# Patient Record
Sex: FEMALE | Race: WHITE | Employment: OTHER | URBAN - METROPOLITAN AREA
[De-identification: names, ages, dates, MRNs, and addresses within clinical notes are randomized per-mention and may not be internally consistent; named-entity substitution may affect disease eponyms.]

---

## 2017-05-31 ENCOUNTER — OFFICE VISIT (OUTPATIENT)
Dept: INTERNAL MEDICINE CLINIC | Age: 57
End: 2017-05-31

## 2017-05-31 VITALS
HEIGHT: 65 IN | OXYGEN SATURATION: 96 % | WEIGHT: 133 LBS | BODY MASS INDEX: 22.16 KG/M2 | SYSTOLIC BLOOD PRESSURE: 124 MMHG | DIASTOLIC BLOOD PRESSURE: 75 MMHG | RESPIRATION RATE: 16 BRPM | HEART RATE: 58 BPM | TEMPERATURE: 98.4 F

## 2017-05-31 DIAGNOSIS — S62.622A CLOSED DISPLACED FRACTURE OF MIDDLE PHALANX OF RIGHT MIDDLE FINGER, INITIAL ENCOUNTER: Primary | ICD-10-CM

## 2017-05-31 NOTE — PATIENT INSTRUCTIONS
Finger Fracture: Care Instructions  Your Care Instructions    Breaks in the bones of the finger usually heal well in about 3 to 4 weeks. The pain and swelling from a broken finger can last for weeks. But it should steadily improve, starting a few days after you break it. It is very important that you wear and take care of the cast or splint exactly as your doctor tells you to so that your finger heals properly and does not end up crooked. Wearing a splint may interfere with your normal activities. Ask for help with daily tasks if you need it. You heal best when you take good care of yourself. Eat a variety of healthy foods, and don't smoke. Follow-up care is a key part of your treatment and safety. Be sure to make and go to all appointments, and call your doctor if you are having problems. It's also a good idea to know your test results and keep a list of the medicines you take. How can you care for yourself at home? · If your doctor put a splint on your finger, wear the splint exactly as directed. Do not remove it until your doctor says that you can. · Keep your hand raised above the level of your heart as much as you can. This will help reduce swelling. · Put ice or a cold pack on your finger for 10 to 20 minutes at a time. Try to do this every 1 to 2 hours for the next 3 days (when you are awake) or until the swelling goes down. Put a thin cloth between the ice and your skin. Keep the splint dry. · Be safe with medicines. Take pain medicines exactly as directed. ¨ If the doctor gave you a prescription medicine for pain, take it as prescribed. ¨ If you are not taking a prescription pain medicine, ask your doctor if you can take an over-the-counter medicine. When should you call for help? Call 911 anytime you think you may need emergency care. For example, call if:  · Your finger is cool or pale or changes color.   Call your doctor now or seek immediate medical care if:  · Your pain gets much worse.  · You have tingling, weakness, or numbness in your finger. · You have signs of infection, such as:  ¨ Increased pain, swelling, warmth, or redness. ¨ Red streaks leading from the area. ¨ Pus draining from the area. ¨ Swollen lymph nodes in your neck, armpits, or groin. ¨ A fever. Watch closely for changes in your health, and be sure to contact your doctor if:  · Your finger is not steadily improving. Where can you learn more? Go to http://danica-deejay.info/. Enter L367 in the search box to learn more about \"Finger Fracture: Care Instructions. \"  Current as of: May 23, 2016  Content Version: 11.2  © 6383-8742 [employer]. Care instructions adapted under license by [employer] (which disclaims liability or warranty for this information). If you have questions about a medical condition or this instruction, always ask your healthcare professional. [place] [address] any warranty or liability for your use of this information.

## 2017-05-31 NOTE — PROGRESS NOTES
CC:  Chief Complaint   Patient presents with    Other     broken middle finger right hand     HISTORY OF PRESENT ILLNESS  Avani Powell is a 60 y.o. female. She is a new patient to this clinic. She presents with a finger fracture and is in need of a referral to see an orthopedist. Was holding on to a dog leash when the jerked away. Seen at [facility] today. Diagnosed with fracture of right middle finger. Was told to see a hand orthopedist, Dr. Cleo Bernal. Appointment tomorrow. Needs referral. Denies fevers, chills, CP, or SOB. Patient Active Problem List   Diagnosis Code    Pure hypercholesterolemia E78.00    Elevated blood pressure reading without diagnosis of hypertension R03.0    Depression F32.9    Anxiety state, unspecified F41.1    Insomnia G47.00     Past Medical History:   Diagnosis Date    ASCUS on Pap smear 1998    Chickenpox childhood    Clavicle fracture 1968    Closed fracture of tibial plateau 10/1548    Right.  ([address])    Depression with anxiety 2011    Dr. Mayra Gaines Fibrocystic breast     bilateral    High cholesterol 04/2007     No Known Allergies     Current Outpatient Prescriptions   Medication Sig Dispense Refill    ergocalciferol (ERGOCALCIFEROL) 50,000 unit capsule Take 1 capsule by mouth every seven (7) days. 5 capsule 2    ARIPiprazole (ABILIFY) 5 mg tablet Take 5 mg by mouth daily.  LORAZEPAM (ATIVAN PO) Take  by mouth.  buPROPion SR (WELLBUTRIN SR) 200 mg SR tablet Take 200 mg by mouth daily. Dr. Rozina Rod venlafaxine-SR [facility] XR) 150 mg capsule Take 150 mg by mouth two (2) times a day. Dr. Luh Osborne /75 (BP 1 Location: Left arm, BP Patient Position: Sitting)    Pulse (!) 58    Temp 98.4 °F (36.9 °C) (Oral)    Resp 16    Ht 5' 5\" (1.651 m)    Wt 133 lb (60.3 kg)    LMP 02/15/2011    SpO2 96%    BMI 22.13 kg/m2     General: Well-developed and well-nourished, no distress.   HEENT:  Head normocephalic/atraumatic, no scleral icterus  Lungs:  Clear to ausculation bilaterally. Good air movement. Heart: Bradycardic, regular rhythm, normal S1 and S2, no murmur, gallop, or rub  Extremities: No clubbing, cyanosis. Right middle finger edematous and skewed in position. Neurological: Alert and oriented. Psychiatric: Normal mood and affect. Behavior is normal.       ASSESSMENT AND PLAN    ICD-10-CM ICD-9-CM    1. Closed displaced fracture of middle phalanx of right middle finger, initial encounter S62.622A 816.01 REFERRAL TO ORTHOPEDICS       Jesús Alicea was seen today for other. Diagnoses and all orders for this visit:    Closed displaced fracture of middle phalanx of right middle finger, initial encounter  -     REFERRAL TO ORTHOPEDICS (Dr. Nick Wilson)      Follow-up Disposition:  Return if symptoms worsen or fail to improve, for Schedule for new patient visit. Provided patient and/or family with advanced directive information and answered pertinent questions. Encouraged patient to provide a copy of advanced directive to the office when available. I have discussed the diagnosis with the patient and the intended plan as seen in the above orders. Patient is in agreement. The patient has received an after-visit summary and questions were answered concerning future plans. I have discussed medication side effects and warnings with the patient as well.

## 2017-05-31 NOTE — PROGRESS NOTES
Reviewed record  In preparation for visit and have obtained necessary documentation. 1. Have you been to the ER, urgent care clinic since your last visit? Hospitalized since your last visit?seen at [facility]   2. Have you seen or consulted any other health care providers outside of the [address] [place] [address] Drive since your last visit? Include any pap smears or colon screening. Previous PCP in [address] [place] [address]  Patient does not currently have advance directives. Patient given information on advance directives and brochure and printed blank advance directive form made available to patient. Patient is also asked to make copy of directives available to this office for our/[facility] records once advanced directives are completed.

## 2017-05-31 NOTE — MR AVS SNAPSHOT
Visit Information Date & Time Provider Department Dept. Phone Encounter #  
 5/31/2017  2:00 PM Mayco Schilling MD [place] [phone] [id number] Follow-up Instructions Return if symptoms worsen or fail to improve, for Schedule for new patient visit. Upcoming Health Maintenance Date Due Hepatitis C Screening 1960 FOBT Q 1 YEAR AGE 50-75 6/13/2008 BREAST CANCER SCRN MAMMOGRAM 2/11/2014 PAP AKA CERVICAL CYTOLOGY 9/23/2017 INFLUENZA AGE 9 TO ADULT 8/1/2017 DTaP/Tdap/Td series (2 - Td) 2/1/2022 Allergies as of 5/31/2017  Review Complete On: 5/31/2017 By: Mayco Schilling MD  
 No Known Allergies Current Immunizations  Reviewed on 9/23/2014 Name Date HEP A/HEP B Combined Vaccine 3/7/2012, 2/1/2012 TD Vaccine 2/1/1998 TDAP Vaccine 2/1/2012 Not reviewed this visit You Were Diagnosed With   
  
 Codes Comments Closed displaced fracture of middle phalanx of right middle finger, initial encounter    -  Primary ICD-10-CM: U68.873D ICD-9-CM: 816.01 Vitals BP Pulse Temp Resp Height(growth percentile) Weight(growth percentile) 124/75 (BP 1 Location: Left arm, BP Patient Position: Sitting) (!) 58 98.4 °F (36.9 °C) (Oral) 16 5' 5\" (1.651 m) 133 lb (60.3 kg) LMP SpO2 BMI OB Status Smoking Status 02/15/2011 96% 22.13 kg/m2 Postmenopausal Former Smoker BMI and BSA Data Body Mass Index Body Surface Area  
 22.13 kg/m 2 1.66 m 2 Preferred Pharmacy Pharmacy Name Phone [id number] [address] [place] [id number] [phone] Your Updated Medication List  
  
   
This list is accurate as of: 5/31/17  2:56 PM.  Always use your most recent med list.  
  
  
  
  
 ABILIFY 5 mg tablet Generic drug:  ARIPiprazole Take 5 mg by mouth daily. ATIVAN PO Take  by mouth. EFFEXOR  mg capsule Generic drug:  venlafaxine-SR  
 Take 150 mg by mouth two (2) times a day. Dr. Nancy Carver  
  
 ergocalciferol 50,000 unit capsule Commonly known as:  ERGOCALCIFEROL Take 1 capsule by mouth every seven (7) days. WELLBUTRIN  mg SR tablet Generic drug:  buPROPion SR Take 200 mg by mouth daily. Dr. Nancy Carver We Performed the Following REFERRAL TO ORTHOPEDICS [WBT647 Custom] Comments:  
 Evaluation and management of right middle finger. Follow-up Instructions Return if symptoms worsen or fail to improve, for Schedule for new patient visit. Referral Information Referral ID Referred By Referred To  
  
 [id number] STEPHANIE, [address] [facility] [address] [facility], [address] Phone: [phone] Fax: [phone] Visits Status Start Date End Date 1 New Request 5/31/17 5/31/18 If your referral has a status of pending review or denied, additional information will be sent to support the outcome of this decision. Patient Instructions Finger Fracture: Care Instructions Your Care Instructions Breaks in the bones of the finger usually heal well in about 3 to 4 weeks. The pain and swelling from a broken finger can last for weeks. But it should steadily improve, starting a few days after you break it. It is very important that you wear and take care of the cast or splint exactly as your doctor tells you to so that your finger heals properly and does not end up crooked. Wearing a splint may interfere with your normal activities. Ask for help with daily tasks if you need it. You heal best when you take good care of yourself. Eat a variety of healthy foods, and don't smoke. Follow-up care is a key part of your treatment and safety. Be sure to make and go to all appointments, and call your doctor if you are having problems. It's also a good idea to know your test results and keep a list of the medicines you take. How can you care for yourself at home? · If your doctor put a splint on your finger, wear the splint exactly as directed. Do not remove it until your doctor says that you can. · Keep your hand raised above the level of your heart as much as you can. This will help reduce swelling. · Put ice or a cold pack on your finger for 10 to 20 minutes at a time. Try to do this every 1 to 2 hours for the next 3 days (when you are awake) or until the swelling goes down. Put a thin cloth between the ice and your skin. Keep the splint dry. · Be safe with medicines. Take pain medicines exactly as directed. ¨ If the doctor gave you a prescription medicine for pain, take it as prescribed. ¨ If you are not taking a prescription pain medicine, ask your doctor if you can take an over-the-counter medicine. When should you call for help? Call 911 anytime you think you may need emergency care. For example, call if: 
· Your finger is cool or pale or changes color. Call your doctor now or seek immediate medical care if: 
· Your pain gets much worse. · You have tingling, weakness, or numbness in your finger. · You have signs of infection, such as: 
¨ Increased pain, swelling, warmth, or redness. ¨ Red streaks leading from the area. ¨ Pus draining from the area. ¨ Swollen lymph nodes in your neck, armpits, or groin. ¨ A fever. Watch closely for changes in your health, and be sure to contact your doctor if: 
· Your finger is not steadily improving. Where can you learn more? Go to http://danica-deejay.info/. Enter Z861 in the search box to learn more about \"Finger Fracture: Care Instructions. \" Current as of: May 23, 2016 Content Version: 11.2 © 9892-5394 [email or web address]. Care instructions adapted under license by [employer] (which disclaims liability or warranty for this information).  If you have questions about a medical condition or this instruction, always ask your healthcare professional. [facility] any warranty or liability for your use of this information. Introducing [facility]! Ellen Lugo introduces [employer] patient portal. Now you can access parts of your medical record, email your doctor's office, and request medication refills online. 1. In your internet browser, go to [email or web address] 2. Click on the First Time User? Click Here link in the Sign In box. You will see the New Member Sign Up page. 3. Enter your [employer] Access Code exactly as it appears below. You will not need to use this code after youve completed the sign-up process. If you do not sign up before the expiration date, you must request a new code. · [employer] Access Code: [email or web address] Expires: 8/29/2017  2:56 PM 
 
4. Enter the last four digits of your Social Security Number (xxxx) and Date of Birth (mm/dd/yyyy) as indicated and click Submit. You will be taken to the next sign-up page. 5. Create a [employer] ID. This will be your [employer] login ID and cannot be changed, so think of one that is secure and easy to remember. 6. Create a [employer] password. You can change your password at any time. 7. Enter your Password Reset Question and Answer. This can be used at a later time if you forget your password. 8. Enter your e-mail address. You will receive e-mail notification when new information is available in [address]. 9. Click Sign Up. You can now view and download portions of your medical record. 10. Click the Download Summary menu link to download a portable copy of your medical information. If you have questions, please visit the Frequently Asked Questions section of the [employer] website. Remember, [employer] is NOT to be used for urgent needs. For medical emergencies, dial 911. Now available from your iPhone and Android! Please provide this summary of care documentation to your next provider. Your primary care clinician is listed as Maxine Ruiz. If you have any questions after today's visit, please call [phone].

## 2017-06-03 ENCOUNTER — HOSPITAL ENCOUNTER (EMERGENCY)
Age: 57
Discharge: HOME | End: 2017-06-03
Attending: EMERGENCY MEDICINE
Payer: COMMERCIAL

## 2017-06-03 VITALS
RESPIRATION RATE: 15 BRPM | BODY MASS INDEX: 22.33 KG/M2 | HEIGHT: 65 IN | HEART RATE: 63 BPM | OXYGEN SATURATION: 100 % | TEMPERATURE: 98.6 F | DIASTOLIC BLOOD PRESSURE: 95 MMHG | WEIGHT: 134 LBS | SYSTOLIC BLOOD PRESSURE: 139 MMHG

## 2017-06-03 DIAGNOSIS — F41.0 ANXIETY ATTACK: Primary | ICD-10-CM

## 2017-06-03 PROCEDURE — 99283 EMERGENCY DEPT VISIT LOW MDM: CPT

## 2017-06-03 NOTE — DISCHARGE INSTRUCTIONS
Panic Attacks: Care Instructions  Your Care Instructions  During a panic attack, you may have a feeling of intense fear or terror, trouble breathing, chest pain or tightness, heartbeat changes, dizziness, sweating, and shaking. A panic attack starts suddenly and usually lasts from 5 to 20 minutes but may last even longer. You have the most anxiety about 10 minutes after the attack starts. An attack can begin with a stressful event, or it can happen without a cause. Although panic attacks can cause scary symptoms, you can learn to manage them with self-care, counseling, and medicine. Follow-up care is a key part of your treatment and safety. Be sure to make and go to all appointments, and call your doctor if you are having problems. It's also a good idea to know your test results and keep a list of the medicines you take. How can you care for yourself at home? · Take your medicine exactly as directed. Call your doctor if you think you are having a problem with your medicine. · Go to your counseling sessions and follow-up appointments. · Recognize and accept your anxiety. Then, when you are in a situation that makes you anxious, say to yourself, \"This is not an emergency. I feel uncomfortable, but I am not in danger. I can keep going even if I feel anxious. \"  · Be kind to your body:  ¨ Relieve tension with exercise or a massage. ¨ Get enough rest.  ¨ Avoid alcohol, caffeine, nicotine, and illegal drugs. They can increase your anxiety level, cause sleep problems, or trigger a panic attack. ¨ Learn and do relaxation techniques. See below for more about these techniques. · Engage your mind. Get out and do something you enjoy. Go to a funny movie, or take a walk or hike. Plan your day. Having too much or too little to do can make you anxious. · Keep a record of your symptoms. Discuss your fears with a good friend or family member, or join a support group for people with similar problems.  Talking to others sometimes relieves stress. · Get involved in social groups, or volunteer to help others. Being alone sometimes makes things seem worse than they are. · Get at least 30 minutes of exercise on most days of the week to relieve stress. Walking is a good choice. You also may want to do other activities, such as running, swimming, cycling, or playing tennis or team sports. Relaxation techniques  Do relaxation exercises for 10 to 20 minutes a day. You can play soothing, relaxing music while you do them, if you wish. · Tell others in your house that you are going to do your relaxation exercises. Ask them not to disturb you. · Find a comfortable place, away from all distractions and noise. · Lie down on your back, or sit with your back straight. · Focus on your breathing. Make it slow and steady. · Breathe in through your nose. Breathe out through either your nose or mouth. · Breathe deeply, filling up the area between your navel and your rib cage. Breathe so that your belly goes up and down. · Do not hold your breath. · Breathe like this for 5 to 10 minutes. Notice the feeling of calmness throughout your whole body. As you continue to breathe slowly and deeply, relax by doing the following for another 5 to 10 minutes:  · Tighten and relax each muscle group in your body. You can begin at your toes and work your way up to your head. · Imagine your muscle groups relaxing and becoming heavy. · Empty your mind of all thoughts. · Let yourself relax more and more deeply. · Become aware of the state of calmness that surrounds you. · When your relaxation time is over, you can bring yourself back to alertness by moving your fingers and toes and then your hands and feet and then stretching and moving your entire body. Sometimes people fall asleep during relaxation, but they usually wake up shortly afterward.   · Always give yourself time to return to full alertness before you drive a car or do anything that might cause an accident if you are not fully alert. Never play a relaxation tape while driving a car. When should you call for help? Call 911 anytime you think you may need emergency care. For example, call if:  · You feel you cannot stop from hurting yourself or someone else. Watch closely for changes in your health, and be sure to contact your doctor if:  · Your panic attacks get worse. · You have new or different anxiety. · You are not getting better as expected. Where can you learn more? Go to http://danica-deejay.info/. Enter H601 in the search box to learn more about \"Panic Attacks: Care Instructions. \"  Current as of: July 26, 2016  Content Version: 11.2  © 9380-9331 [employer]. Care instructions adapted under license by [employer] (which disclaims liability or warranty for this information). If you have questions about a medical condition or this instruction, always ask your healthcare professional. [place] [address] any warranty or liability for your use of this information.

## 2017-06-03 NOTE — ED NOTES
Received patient to exam room. Pt in bed in position of comfort and call bell within reach. Pt started with panic attack/anxiety attack type symptoms around noon. Symptoms progressively got worse despite patient self medicating with her vistaril (3) and called ems. Pt states on arrival that her symptoms have lessened--vs stable.

## 2017-06-03 NOTE — ED NOTES
Bedside and Verbal shift change report given to Luiza Urbina (oncoming nurse) by Ryan Marsh (offgoing nurse). Report included the following information SBAR, ED Summary, Intake/Output, MAR and Recent Results. Pt sleeping comfortably in bed, in NAD. Pt updated on plan of care, to be discharged. Pt given room phone to call for a ride. Pt instructed to let this RN know when she secures a ride. Pt verbalized understanding.

## 2017-06-03 NOTE — ED NOTES
Pt states that financial constraints are major trigger for her anxiety/panic attacks. Pt has pending finger surgery Monday. Pt is 8 months sobber from etoh abuse.

## 2017-06-03 NOTE — ED NOTES
Discharge instructions reviewed with pt. Discharge instructions given to pt per Dr. Noemí Castillo. Pt able to return/verbalize discharge instructions. Copy of discharge instructions given. Pt condition stable, respiratory status within normal limits, neuro status intact. Pt ambulatory out of ER to waiting room to await for ride. Per pt, \"ex-\" is picking her up. VSS.

## 2017-06-03 NOTE — ED PROVIDER NOTES
HPI Comments: Estuardo Oviedo is a 60 y.o. female with PMHx significant for EtOH abuse,depression,anxiety who presents via EMS to the ED with cc of panic attack, happening immediately PTA and mostly resolved by time of arrival. She states that today she felt like she might be having a panic attack so took 50 mg vistaril around noon and went to her neighbor's garden for about half an hour. Pt reports that she had an overwhelming \"tsunami\" of anxiety and went home to take another vistaril. She states that she was lying in bed but was breathing uncontrollably and was overwhelmed to the point she had to call 911. Pt further states that her sxs began to jae on the way to the ER. She notes that she is seeing [facility] for mental health services. Pt denies any HI,SI. Pt reports a h/o depression starting with post partum depression after having her second child. She notes that she has been on depression medications for years and is currently on [place]. Pt states that she never had anxiety until 8 months ago when she was battling alcoholism; she reports that she had her first panic attack (that required her to go to the ED) while she was visiting her sister. She further reports that she was dealing with multiple life stressors at that time that were more than she could handle, and has been dealing with anxiety since that time. Pt states that she has been sober for 8 months and regularly attends AA meetings. She further notes that she just lost her 3rd job in 8 months and is concerned that she may not be able to find another job at 60years old, expresses concern about keeping her house. 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 PCP: Jonelle Ortiz MD    Social History: smoking (quit 2013) EtOH(1 oz/week) illicit drug (-)      There are no other complaints, changes, or physical findings at this time. The history is provided by the patient.         Past Medical History:   Diagnosis Date    ASCUS on Pap smear 1998    Chickenpox childhood    Clavicle fracture 1968    Closed fracture of tibial plateau 51/5060    Right.  ([address])    Depression with anxiety 2011    Dr. Abbie Baker Fibrocystic breast     bilateral    High cholesterol 04/2007       Past Surgical History:   Procedure Laterality Date    HX DILATION AND CURETTAGE  1985    due to afterbirth-vag. bleeding     HX TONSIL AND ADENOIDECTOMY  1962    HX WISDOM TEETH EXTRACTION      TIBIAL SCOPE/SURG/FX AID,BICONDYLAR  02/2010         Family History:   Problem Relation Age of Onset    Anxiety Mother     Arthritis-osteo Mother     Cancer Mother     Heart Disease Maternal Grandfather      CHF    Stroke Maternal Grandfather     Hypertension Maternal Grandfather     Cancer Father      prostate    Dementia Maternal Grandmother     Depression Paternal Aunt     Depression Paternal Uncle        Social History     Social History    Marital status:      Spouse name: N/A    Number of children: N/A    Years of education: N/A     Occupational History    Not on file. Social History Main Topics    Smoking status: Former Smoker     Packs/day: 0.50     Years: 5.00     Types: Cigarettes     Quit date: 9/23/2013    Smokeless tobacco: Never Used      Comment: smokes 20/week    Alcohol use 1.0 oz/week     2 Glasses of wine per week      Comment: white wine    Drug use: No    Sexual activity: Yes     Partners: Female     Birth control/ protection: None     Other Topics Concern    Not on file     Social History Narrative         ALLERGIES: Review of patient's allergies indicates no known allergies. Review of Systems   Constitutional: Negative for chills, diaphoresis, fever and unexpected weight change. HENT: Negative for rhinorrhea and sore throat. Eyes: Negative for pain. Respiratory: Negative for shortness of breath, wheezing and stridor. Cardiovascular: Negative for chest pain and leg swelling. Gastrointestinal: Negative for abdominal pain, blood in stool, diarrhea, nausea and vomiting. Genitourinary: Negative for difficulty urinating, dysuria and flank pain. Musculoskeletal: Negative for back pain and neck stiffness. Skin: Negative for rash. Neurological: Negative for seizures, syncope, weakness, light-headedness and headaches. Psychiatric/Behavioral: Negative for confusion. The patient is nervous/anxious. Vitals:    06/03/17 1439 06/03/17 1617   BP: 126/80 (!) 139/95   Pulse: 63    Resp: 15    Temp: 98.6 °F (37 °C)    SpO2: 100%    Weight: 60.8 kg (134 lb)    Height: 5' 5\" (1.651 m)             Physical Exam   Constitutional: She is oriented to person, place, and time. She appears well-developed and well-nourished. No distress. HENT:   Nose: Nose normal.   Mouth/Throat: Oropharynx is clear and moist. No oropharyngeal exudate. Eyes: Conjunctivae and EOM are normal. Pupils are equal, round, and reactive to light. Right eye exhibits no discharge. Left eye exhibits no discharge. No scleral icterus. Neck: Normal range of motion. Neck supple. No JVD present. Cardiovascular: Normal rate, regular rhythm, normal heart sounds and intact distal pulses. No murmur heard. Pulmonary/Chest: Effort normal and breath sounds normal. No stridor. No respiratory distress. She has no wheezes. She has no rales. Abdominal: Soft. Bowel sounds are normal. She exhibits no distension. There is no tenderness. There is no rebound and no guarding. Musculoskeletal: She exhibits no edema or tenderness. Neurological: She is alert and oriented to person, place, and time. Skin: Skin is warm and dry. No rash noted. She is not diaphoretic. Psychiatric: She has a normal mood and affect. Nursing note and vitals reviewed.        MDM  Number of Diagnoses or Management Options  Anxiety attack:   Diagnosis management comments: Anxiety attack in pt with underlying chronic depression, h/o EtOH abuse in remission, and multiple life stressors. PTA, pt's self administered medication working and sxs resolved. No further workup warranted at this time. Pt already followed by mental health. Stable for discharge. Amount and/or Complexity of Data Reviewed  Review and summarize past medical records: yes    Patient Progress  Patient progress: stable    ED Course       Procedures   DISCHARGE NOTE:    IMPRESSION:  1. Anxiety attack        PLAN:  Discharge Medication List as of 6/3/2017  3:29 PM        Follow-up Information     Follow up With Details Comments [address], [place] Call As needed [address] [place] [id number]  [phone]      [facility] EMERGENCY DEPT  As needed, If symptoms worsen [address] [facility]  [address] [facility]  [phone]        Return to ED if worse       Discharge Note:  3:40 PM  The patient has been re-evaluated and is ready for discharge. Reviewed available results with patient. Counseled patient on diagnosis and care plan. Patient has expressed understanding, and all questions have been answered. Patient agrees with plan and agrees to follow up as recommended, or to return to the ED if their symptoms worsen. Discharge instructions have been provided and explained to the patient, along with reasons to return to the ED. Written by Danny Simon, ED Scribe, as dictated by Jonnathan Hernández MD.       ATTESTATION:  This note is prepared by Danny Simon, acting as Scribe for Jonnathan Hernández MD.    Jonnathan Hernández MD :The scribe's documentation has been prepared under my direction and personally reviewed by me in its entirety. I confirm that the note above accurately reflects all work, treatment, procedures, and medical decision making performed by me.

## 2017-06-06 ENCOUNTER — OFFICE VISIT (OUTPATIENT)
Dept: INTERNAL MEDICINE CLINIC | Age: 57
End: 2017-06-06

## 2017-06-06 VITALS
OXYGEN SATURATION: 98 % | SYSTOLIC BLOOD PRESSURE: 123 MMHG | BODY MASS INDEX: 22.49 KG/M2 | WEIGHT: 135 LBS | HEART RATE: 67 BPM | HEIGHT: 65 IN | TEMPERATURE: 98.8 F | DIASTOLIC BLOOD PRESSURE: 78 MMHG | RESPIRATION RATE: 16 BRPM

## 2017-06-06 DIAGNOSIS — Z48.02 ENCOUNTER FOR REMOVAL OF SUTURES: ICD-10-CM

## 2017-06-06 DIAGNOSIS — Z76.89 ENCOUNTER TO ESTABLISH CARE: Primary | ICD-10-CM

## 2017-06-06 DIAGNOSIS — F10.21 ALCOHOL DEPENDENCE IN REMISSION (HCC): ICD-10-CM

## 2017-06-06 DIAGNOSIS — F41.8 DEPRESSION WITH ANXIETY: ICD-10-CM

## 2017-06-06 DIAGNOSIS — Z11.59 NEED FOR HEPATITIS C SCREENING TEST: ICD-10-CM

## 2017-06-06 DIAGNOSIS — E55.9 VITAMIN D DEFICIENCY: ICD-10-CM

## 2017-06-06 DIAGNOSIS — Z00.00 ROUTINE GENERAL MEDICAL EXAMINATION AT A HEALTH CARE FACILITY: ICD-10-CM

## 2017-06-06 DIAGNOSIS — Z12.39 SCREENING FOR BREAST CANCER: ICD-10-CM

## 2017-06-06 RX ORDER — HYDROXYZINE PAMOATE 50 MG/1
50 CAPSULE ORAL
COMMUNITY
Start: 2017-05-30 | End: 2017-11-17 | Stop reason: SDUPTHER

## 2017-06-06 RX ORDER — TRAZODONE HYDROCHLORIDE 100 MG/1
TABLET ORAL
COMMUNITY
Start: 2017-05-30 | End: 2017-06-06 | Stop reason: ALTCHOICE

## 2017-06-06 NOTE — PROGRESS NOTES
CC:  Chief Complaint   Patient presents with   [address]     HISTORY OF PRESENT ILLNESS  David Cevallos is a 60 y.o. female. Presents to establish care. Previous PCP was Dr. Tamiko Elder at [facility]. She has depression with anxiety, alcohol dependence in remission  and hyperlipidemia. She recently fractured her right middle finger. Has surgery with Dr. Gia Christopher tomorrow. Had sutures placed at right thumb at [facility]. Put in 6 days ago; was told to have them removed after 5 days. She has history of alcoholism (8 months sober), depression,     Depression   Started in 1987 as postpartum depression then persisted. Depression runs on her father's side of the family. Has been on Zoloft, Prozac. Denies hospitalizations for Hospitalized for anxiety in [place] and later at [facility]. Has struggled with alcoholism for years. Other Providers: Dr. Luther Potts ([facility])     Soc Hx  . Has 3 children and 1 grandchild. Lives alone in a house. Unemployed 3 times over past 11 months. 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 History of alcohol abuse; sober for 8 months. Goes to [employer] [place] once daily. Does yoga for exercise. Walks dog 1. 5 hours a day. Health Maintenance  Flu vaccine: did not get in [id number]        Tetanus vaccine:  Tdap 2/1/12    Hepatitis B vaccine: completed in 2012  Colonoscopy: due for this  Pap smear: due for this  Mammogram: due for this  Eye exam: over 10 yrs ago; due for this  Lipids: will check today  A1c: will check today  Advanced Directives: given information  End of Life: given information      ROS  Constitutional: negative for fevers, chills, night sweats  ENT:   negative for sore throat, nasal congestion, ear pains, hoarseness  Respiratory:  negative for cough, hemoptysis, dyspnea,wheezing  CV:   negative for chest pain, palpitations, lower extremity edema  GI:   negative for heartburn, abd pain, nausea, vomiting, diarrhea, constipation  Genitourinary: negative for frequency, dysuria and hematuria  Integument:  negative for rash and pruritus  Musculoskel: negative for myalgias, arthralgias, back pain, muscle weakness, joint pain  Neurological:  negative for headaches, dizziness, vertigo, gait problems  Behavl/Psych: negative for feelings of anxiety, depression, mood change      Patient Active Problem List   Diagnosis Code    Pure hypercholesterolemia E78.00    Elevated blood pressure reading without diagnosis of hypertension R03.0    Depression F32.9    Anxiety state, unspecified F41.1    Insomnia G47.00     Past Medical History:   Diagnosis Date    ASCUS on Pap smear 1998    Chickenpox childhood    Clavicle fracture 1968    Closed fracture of tibial plateau 17/5195    Right.  ([address])    Depression with anxiety 2011    Dr. Mayra Gaines Fibrocystic breast     bilateral    High cholesterol 04/2007     Past Surgical History:   Procedure Laterality Date    HX DILATION AND CURETTAGE  1985    due to [address] [facility]. bleeding     HX TONSIL AND ADENOIDECTOMY  1962    HX WISDOM TEETH EXTRACTION      TIBIAL SCOPE/SURG/FX AID,BICONDYLAR  02/2010     Social History     Social History    Marital status:      Spouse name: N/A    Number of children: N/A    Years of education: N/A     Social History Main Topics    Smoking status: Current Every Day Smoker     Packs/day: 0.25     Years: 5.00     Types: Cigarettes     Last attempt to quit: 9/23/2013    Smokeless tobacco: Never Used      Comment: smokes 20/week    Alcohol use No    Drug use: No    Sexual activity: Yes     Partners: Female     Birth control/ protection: None     Other Topics Concern    None     Social History Narrative     Family History   Problem Relation Age of Onset    Anxiety Mother     Arthritis-osteo Mother     Cancer Mother     Heart Disease Maternal Grandfather      CHF    Stroke Maternal Grandfather     Hypertension Maternal Grandfather     Cancer Father      prostate    Dementia Maternal Grandmother     Depression Paternal Aunt     Depression Paternal Uncle      No Known Allergies  Current Outpatient Prescriptions   Medication Sig Dispense Refill    LURASIDONE HCL (LATUDA PO) Take 50 mg by mouth daily.  hydrOXYzine pamoate (VISTARIL) 50 mg capsule Take 50 mg by mouth four (4) times daily as needed. PHYSICAL EXAM    Visit Vitals    /78 (BP 1 Location: Left arm, BP Patient Position: Sitting)    Pulse 67    Temp 98.8 °F (37.1 °C) (Oral)    Resp 16    Ht 5' 5\" (1.651 m)    Wt 135 lb (61.2 kg)    LMP 02/15/2011    SpO2 98%    BMI 22.47 kg/m2       General: Well-developed and well-nourished, no distress. HEENT:  Head normocephalic/atraumatic, no scleral icterus  Neck: Supple. No carotid bruits, JVD, lymphadenopathy, or thyromegaly. Lungs:  Clear to ausculation bilaterally. Good air movement. Heart:  Regular rate and rhythm, normal S1 and S2, no murmur, gallop, or rub  Abdomen: Soft, non-distended, normal bowel sounds, tenderness at epigastrium, no guarding, masses, rebound tenderness, or HSM. Extremities: No clubbing, cyanosis, or edema. Neurological: Alert and oriented. Psychiatric: Normal mood and affect. Behavior is normal.          ASSESSMENT AND PLAN    ICD-10-CM ICD-9-CM    1. Encounter to establish care Z76.89 V65.8    2. Routine general medical examination at a health care facility D48.16 D27.6 METABOLIC PANEL, COMPREHENSIVE      CBC WITH AUTOMATED DIFF      TSH AND FREE T4      LIPID PANEL      HEMOGLOBIN A1C WITH EAG   3. Depression with anxiety F41.8 300.4    4. Alcohol dependence in remission ([facility] [address] F10.21 303.93    5. Vitamin D deficiency E55.9 268.9 VITAMIN D, 25 HYDROXY   6. Need for hepatitis C screening test Z11.59 V73.89    7. Screening for breast cancer Z12.39 V76.10 [facility] MAMMO BI SCREENING INCL CAD   8.  Encounter for removal of sutures Z48.02 V58.32 REMOVAL OF SUTURES       Pancho Luong was seen today to establish care. Diagnoses and all orders for this visit:    Encounter to establish care    Routine general medical examination at a health care facility  -     METABOLIC PANEL, COMPREHENSIVE  -     CBC WITH AUTOMATED DIFF  -     TSH AND FREE T4  -     LIPID PANEL  -     HEMOGLOBIN A1C WITH EAG    Depression with anxiety  Continue present management. Alcohol dependence in remission ([facility] [address]    Vitamin D deficiency  -     VITAMIN D, 25 HYDROXY    Need for hepatitis C screening test  She was born in the window between [facility] and is a candidate for Hepatitis C screening. Screening for breast cancer  -     [facility] MAMMO BI SCREENING INCL CAD; Future    Encounter for removal of sutures  3 sutures removed from right thumb. No complications.  -     REMOVAL OF SUTURES    Greater than 40 mins direct face-to-face time spent with patient. Greater than 50% of time spent on counseling and coordination of care. Follow-up Disposition:  Return in about 2 months (around 8/6/2017), or if symptoms worsen or fail to improve, for Pap smear. Provided patient and/or family with advanced directive information and answered pertinent questions. Encouraged patient to provide a copy of advanced directive to the office when available. I have discussed the diagnosis with the patient and the intended plan as seen in the above orders. Patient is in agreement. The patient has received an after-visit summary and questions were answered concerning future plans. I have discussed medication side effects and warnings with the patient as well.

## 2017-06-06 NOTE — PATIENT INSTRUCTIONS
Patient Instructions  1. Schedule for mammogram for breast cancer screening. 2. Schedule for eye exam. Make sure you have insurance coverage. 3. Return to clinic in 2 months for Pap smear. 4. We will discuss having a colonoscopy at your next clinic visit. Well Visit, Women 48 to 72: Care Instructions  Your Care Instructions  Physical exams can help you stay healthy. Your doctor has checked your overall health and may have suggested ways to take good care of yourself. He or she also may have recommended tests. At home, you can help prevent illness with healthy eating, regular exercise, and other steps. Follow-up care is a key part of your treatment and safety. Be sure to make and go to all appointments, and call your doctor if you are having problems. It's also a good idea to know your test results and keep a list of the medicines you take. How can you care for yourself at home? · Reach and stay at a healthy weight. This will lower your risk for many problems, such as obesity, diabetes, heart disease, and high blood pressure. · Get at least 30 minutes of exercise on most days of the week. Walking is a good choice. You also may want to do other activities, such as running, swimming, cycling, or playing tennis or team sports. · Do not smoke. Smoking can make health problems worse. If you need help quitting, talk to your doctor about stop-smoking programs and medicines. These can increase your chances of quitting for good. · Protect your skin from too much sun. When you're outdoors from 10 a.m. to 4 p.m., stay in the shade or cover up with clothing and a hat with a wide brim. Wear sunglasses that block UV rays. Even when it's cloudy, put broad-spectrum sunscreen (SPF 30 or higher) on any exposed skin. · See a dentist one or two times a year for checkups and to have your teeth cleaned. · Wear a seat belt in the car. · Limit alcohol to 1 drink a day. Too much alcohol can cause health problems.   Follow your doctor's advice about when to have certain tests. These tests can spot problems early. · Cholesterol. Your doctor will tell you how often to have this done based on your age, family history, or other things that can increase your risk for heart attack and stroke. · Blood pressure. Have your blood pressure checked during a routine doctor visit. Your doctor will tell you how often to check your blood pressure based on your age, your blood pressure results, and other factors. · Mammogram. Ask your doctor how often you should have a mammogram, which is an X-ray of your breasts. A mammogram can spot breast cancer before it can be felt and when it is easiest to treat. · Pap test and pelvic exam. Ask your doctor how often you should have a Pap test. You may not need to have a Pap test as often as you used to. · Vision. Have your eyes checked every year or two or as often as your doctor suggests. Some experts recommend that you have yearly exams for glaucoma and other age-related eye problems starting at age 48. · Hearing. Tell your doctor if you notice any change in your hearing. You can have tests to find out how well you hear. · Diabetes. Ask your doctor whether you should have tests for diabetes. · Colon cancer. You should begin tests for colon cancer at age 48. You may have one of several tests. Your doctor will tell you how often to have tests based on your age and risk. Risks include whether you already had a precancerous polyp removed from your colon or whether your parents, sisters and brothers, or children have had colon cancer. · Thyroid disease. Talk to your doctor about whether to have your thyroid checked as part of a regular physical exam. Women have an increased chance of a thyroid problem. · Osteoporosis. You should begin tests for bone density at age 72. If you are younger than 72, ask your doctor whether you have factors that may increase your risk for this disease.  You may want to have this test before age 72. · Heart attack and stroke risk. At least every 4 to 6 years, you should have your risk for heart attack and stroke assessed. Your doctor uses factors such as your age, blood pressure, cholesterol, and whether you smoke or have diabetes to show what your risk for a heart attack or stroke is over the next 10 years. When should you call for help? Watch closely for changes in your health, and be sure to contact your doctor if you have any problems or symptoms that concern you. Where can you learn more? Go to http://danica-deejay.info/. Enter E770 in the search box to learn more about \"Well Visit, Women 50 to 72: Care Instructions. \"  Current as of: July 19, 2016  Content Version: 11.2  © 5415-1342 [employer]. Care instructions adapted under license by [employer] (which disclaims liability or warranty for this information). If you have questions about a medical condition or this instruction, always ask your healthcare professional. [place] [address] any warranty or liability for your use of this information.

## 2017-06-06 NOTE — PROGRESS NOTES
Reviewed record  In preparation for visit and have obtained necessary documentation. 1. Have you been to the ER, urgent care clinic since your last visit? Hospitalized since your last visit?seen at [facility] 6/3/17  2. Have you seen or consulted any other health care providers outside of the [address] [place] [address] since your last visit? Include any pap smears or colon screening. Sees Dr Malena Morales at [facility]    Patient has been given information on advanced directives at a previous visit.

## 2017-06-06 NOTE — MR AVS SNAPSHOT
Visit Information Date & Time Provider Department Dept. Phone Encounter #  
 6/6/2017  2:20 PM Hilda Lazaro MD [facility] [phone] [id number] Follow-up Instructions Return in about 2 months (around 8/6/2017), or if symptoms worsen or fail to improve, for Pap smear. Upcoming Health Maintenance Date Due Hepatitis C Screening 1960 FOBT Q 1 YEAR AGE 50-75 6/13/2008 BREAST CANCER SCRN MAMMOGRAM 2/11/2014 PAP AKA CERVICAL CYTOLOGY 9/23/2017 INFLUENZA AGE 9 TO ADULT 8/1/2017 DTaP/Tdap/Td series (2 - Td) 2/1/2022 Allergies as of 6/6/2017  Review Complete On: 6/6/2017 By: Hilda Lazaro MD  
 No Known Allergies Current Immunizations  Reviewed on 9/23/2014 Name Date HEP A/HEP B Combined Vaccine 3/7/2012, 2/1/2012 TD Vaccine 2/1/1998 TDAP Vaccine 2/1/2012 Not reviewed this visit You Were Diagnosed With   
  
 Codes Comments Encounter to establish care    -  Primary ICD-10-CM: Z76.89 
ICD-9-CM: V65.8 Routine general medical examination at a health care facility     ICD-10-CM: Z00.00 ICD-9-CM: V70.0 Depression with anxiety     ICD-10-CM: F41.8 ICD-9-CM: 300.4 Alcohol dependence in remission [facility])     ICD-10-CM: B81.33 ICD-9-CM: 303.93 Vitamin D deficiency     ICD-10-CM: E55.9 ICD-9-CM: 268.9 Need for hepatitis C screening test     ICD-10-CM: Z11.59 
ICD-9-CM: V73.89 Screening for breast cancer     ICD-10-CM: Z12.39 
ICD-9-CM: V76.10 Vitals BP Pulse Temp Resp Height(growth percentile) Weight(growth percentile) 123/78 (BP 1 Location: Left arm, BP Patient Position: Sitting) 67 98.8 °F (37.1 °C) (Oral) 16 5' 5\" (1.651 m) 135 lb (61.2 kg) LMP SpO2 BMI OB Status Smoking Status 02/15/2011 98% 22.47 kg/m2 Postmenopausal Current Every Day Smoker BMI and BSA Data Body Mass Index Body Surface Area  
 22.47 kg/m 2 1.68 m 2 Preferred Pharmacy Pharmacy Name Phone [id number] [address] [address] [phone] Your Updated Medication List  
  
   
This list is accurate as of: 6/6/17  3:29 PM.  Always use your most recent med list.  
  
  
  
  
 hydrOXYzine pamoate 50 mg capsule Commonly known as:  VISTARIL Take 50 mg by mouth four (4) times daily as needed. LATUDA PO Take 50 mg by mouth daily. We Performed the Following CBC WITH AUTOMATED DIFF [40792 CPT(R)] HEMOGLOBIN A1C WITH EAG [77246 CPT(R)] LIPID PANEL [30525 CPT(R)] METABOLIC PANEL, COMPREHENSIVE [88992 CPT(R)] TSH AND FREE T4 [06708 CPT(R)] VITAMIN D, 25 HYDROXY [id number] CPT(R)] Follow-up Instructions Return in about 2 months (around 8/6/2017), or if symptoms worsen or fail to improve, for Pap smear. To-Do List   
 06/07/2017 Imaging:  [facility] MAMMO BI SCREENING INCL CAD Patient Instructions Patient Instructions 1. Schedule for mammogram for breast cancer screening. 2. Schedule for eye exam. Make sure you have insurance coverage. 3. Return to clinic in 2 months for Pap smear. 4. We will discuss having a colonoscopy at your next clinic visit. Well Visit, Women 48 to 72: Care Instructions Your Care Instructions Physical exams can help you stay healthy. Your doctor has checked your overall health and may have suggested ways to take good care of yourself. He or she also may have recommended tests. At home, you can help prevent illness with healthy eating, regular exercise, and other steps. Follow-up care is a key part of your treatment and safety. Be sure to make and go to all appointments, and call your doctor if you are having problems. It's also a good idea to know your test results and keep a list of the medicines you take. How can you care for yourself at home? · Reach and stay at a healthy weight.  This will lower your risk for many problems, such as obesity, diabetes, heart disease, and high blood pressure. · Get at least 30 minutes of exercise on most days of the week. Walking is a good choice. You also may want to do other activities, such as running, swimming, cycling, or playing tennis or team sports. · Do not smoke. Smoking can make health problems worse. If you need help quitting, talk to your doctor about stop-smoking programs and medicines. These can increase your chances of quitting for good. · Protect your skin from too much sun. When you're outdoors from 10 a.m. to 4 p.m., stay in the shade or cover up with clothing and a hat with a wide brim. Wear sunglasses that block UV rays. Even when it's cloudy, put broad-spectrum sunscreen (SPF 30 or higher) on any exposed skin. · See a dentist one or two times a year for checkups and to have your teeth cleaned. · Wear a seat belt in the car. · Limit alcohol to 1 drink a day. Too much alcohol can cause health problems. Follow your doctor's advice about when to have certain tests. These tests can spot problems early. · Cholesterol. Your doctor will tell you how often to have this done based on your age, family history, or other things that can increase your risk for heart attack and stroke. · Blood pressure. Have your blood pressure checked during a routine doctor visit. Your doctor will tell you how often to check your blood pressure based on your age, your blood pressure results, and other factors. · Mammogram. Ask your doctor how often you should have a mammogram, which is an X-ray of your breasts. A mammogram can spot breast cancer before it can be felt and when it is easiest to treat. · Pap test and pelvic exam. Ask your doctor how often you should have a Pap test. You may not need to have a Pap test as often as you used to. · Vision. Have your eyes checked every year or two or as often as your doctor suggests.  Some experts recommend that you have yearly exams for glaucoma and other age-related eye problems starting at age 48. · Hearing. Tell your doctor if you notice any change in your hearing. You can have tests to find out how well you hear. · Diabetes. Ask your doctor whether you should have tests for diabetes. · Colon cancer. You should begin tests for colon cancer at age 48. You may have one of several tests. Your doctor will tell you how often to have tests based on your age and risk. Risks include whether you already had a precancerous polyp removed from your colon or whether your parents, sisters and brothers, or children have had colon cancer. · Thyroid disease. Talk to your doctor about whether to have your thyroid checked as part of a regular physical exam. Women have an increased chance of a thyroid problem. · Osteoporosis. You should begin tests for bone density at age 72. If you are younger than 72, ask your doctor whether you have factors that may increase your risk for this disease. You may want to have this test before age 72. · Heart attack and stroke risk. At least every 4 to 6 years, you should have your risk for heart attack and stroke assessed. Your doctor uses factors such as your age, blood pressure, cholesterol, and whether you smoke or have diabetes to show what your risk for a heart attack or stroke is over the next 10 years. When should you call for help? Watch closely for changes in your health, and be sure to contact your doctor if you have any problems or symptoms that concern you. Where can you learn more? Go to http://danica-deejay.info/. Enter B658 in the search box to learn more about \"Well Visit, Women 50 to 72: Care Instructions. \" Current as of: July 19, 2016 Content Version: 11.2 © 1987-8704 Moments.[employer]. Care instructions adapted under license by [employer] (which disclaims liability or warranty for this information).  If you have questions about a medical condition or this instruction, always ask your healthcare professional. [place] [address] any warranty or liability for your use of this information. Introducing [facility]! An Huang introduces [employer] patient portal. Now you can access parts of your medical record, email your doctor's office, and request medication refills online. 1. In your internet browser, go to [email or web address] 2. Click on the First Time User? Click Here link in the Sign In box. You will see the New Member Sign Up page. 3. Enter your [employer] Access Code exactly as it appears below. You will not need to use this code after youve completed the sign-up process. If you do not sign up before the expiration date, you must request a new code. · [employer] Access Code: [email or web address] Expires: 8/29/2017  2:56 PM 
 
4. Enter the last four digits of your Social Security Number (xxxx) and Date of Birth (mm/dd/yyyy) as indicated and click Submit. You will be taken to the next sign-up page. 5. Create a [employer] ID. This will be your [employer] login ID and cannot be changed, so think of one that is secure and easy to remember. 6. Create a [employer] password. You can change your password at any time. 7. Enter your Password Reset Question and Answer. This can be used at a later time if you forget your password. 8. Enter your e-mail address. You will receive e-mail notification when new information is available in [address] 9. Click Sign Up. You can now view and download portions of your medical record. 10. Click the Download Summary menu link to download a portable copy of your medical information. If you have questions, please visit the Frequently Asked Questions section of the [employer] website. Remember, [employer] is NOT to be used for urgent needs. For medical emergencies, dial 911. Now available from your iPhone and Android! Please provide this summary of care documentation to your next provider. Your primary care clinician is listed as Gayle Vásuqez. If you have any questions after today's visit, please call [phone].

## 2017-06-06 NOTE — LETTER
6/22/2017 11:22 AM 
 
Ms. Grady Kurtz  [address] [place] [id number] Hello, your Provider has recommended a Mammogram for you. Our records indicate you are overdue or your annual exam is coming up and this is a reminder. If you have already completed your mammogram outside of [facility], please inform us so we can update your chart. Mammograms are our most powerful breast screening tool. Mammograms should become part of each womans annual regular medical examination. Luckily they are relatively fast and should only be slightly uncomfortable at first.  There is a lot of confusion out there about when and how often to get a mammogram.  Currently, the recommendation is that women get a mammogram once a year, beginning at age 36. Please call [phone] or your gynecologist/screening center to schedule an appointment at your convenience. Thank you for allowing us to assist you in your health care and being part of [facility]! Sincerely, Josee Mccarty MD

## 2017-06-07 LAB
25(OH)D3+25(OH)D2 SERPL-MCNC: 25.1 NG/ML (ref 30–100)
ALBUMIN SERPL-MCNC: 4.4 G/DL (ref 3.5–5.5)
ALBUMIN/GLOB SERPL: 1.8 {RATIO} (ref 1.2–2.2)
ALP SERPL-CCNC: 53 IU/L (ref 39–117)
ALT SERPL-CCNC: 15 IU/L (ref 0–32)
AST SERPL-CCNC: 11 IU/L (ref 0–40)
BASOPHILS # BLD AUTO: 0 X10E3/UL (ref 0–0.2)
BASOPHILS NFR BLD AUTO: 1 %
BILIRUB SERPL-MCNC: 0.2 MG/DL (ref 0–1.2)
BUN SERPL-MCNC: 15 MG/DL (ref 6–24)
BUN/CREAT SERPL: 19 (ref 9–23)
CALCIUM SERPL-MCNC: 9.1 MG/DL (ref 8.7–10.2)
CHLORIDE SERPL-SCNC: 99 MMOL/L (ref 96–106)
CHOLEST SERPL-MCNC: 218 MG/DL (ref 100–199)
CO2 SERPL-SCNC: 24 MMOL/L (ref 18–29)
CREAT SERPL-MCNC: 0.77 MG/DL (ref 0.57–1)
EOSINOPHIL # BLD AUTO: 0.1 X10E3/UL (ref 0–0.4)
EOSINOPHIL NFR BLD AUTO: 2 %
ERYTHROCYTE [DISTWIDTH] IN BLOOD BY AUTOMATED COUNT: 15.1 % (ref 12.3–15.4)
EST. AVERAGE GLUCOSE BLD GHB EST-MCNC: 108 MG/DL
GLOBULIN SER CALC-MCNC: 2.5 G/DL (ref 1.5–4.5)
GLUCOSE SERPL-MCNC: 95 MG/DL (ref 65–99)
HBA1C MFR BLD: 5.4 % (ref 4.8–5.6)
HCT VFR BLD AUTO: 37.4 % (ref 34–46.6)
HDLC SERPL-MCNC: 89 MG/DL
HGB BLD-MCNC: 12.7 G/DL (ref 11.1–15.9)
IMM GRANULOCYTES # BLD: 0 X10E3/UL (ref 0–0.1)
IMM GRANULOCYTES NFR BLD: 0 %
INTERPRETATION, 910389: NORMAL
LDLC SERPL CALC-MCNC: 118 MG/DL (ref 0–99)
LYMPHOCYTES # BLD AUTO: 1.6 X10E3/UL (ref 0.7–3.1)
LYMPHOCYTES NFR BLD AUTO: 30 %
MCH RBC QN AUTO: 29.6 PG (ref 26.6–33)
MCHC RBC AUTO-ENTMCNC: 34 G/DL (ref 31.5–35.7)
MCV RBC AUTO: 87 FL (ref 79–97)
MONOCYTES # BLD AUTO: 0.5 X10E3/UL (ref 0.1–0.9)
MONOCYTES NFR BLD AUTO: 9 %
NEUTROPHILS # BLD AUTO: 3 X10E3/UL (ref 1.4–7)
NEUTROPHILS NFR BLD AUTO: 58 %
PLATELET # BLD AUTO: 295 X10E3/UL (ref 150–379)
POTASSIUM SERPL-SCNC: 4.7 MMOL/L (ref 3.5–5.2)
PROT SERPL-MCNC: 6.9 G/DL (ref 6–8.5)
RBC # BLD AUTO: 4.29 X10E6/UL (ref 3.77–5.28)
SODIUM SERPL-SCNC: 140 MMOL/L (ref 134–144)
T4 FREE SERPL-MCNC: 1.05 NG/DL (ref 0.82–1.77)
TRIGL SERPL-MCNC: 55 MG/DL (ref 0–149)
TSH SERPL DL<=0.005 MIU/L-ACNC: 1.76 UIU/ML (ref 0.45–4.5)
VLDLC SERPL CALC-MCNC: 11 MG/DL (ref 5–40)
WBC # BLD AUTO: 5.3 X10E3/UL (ref 3.4–10.8)

## 2017-06-09 DIAGNOSIS — E55.9 VITAMIN D DEFICIENCY: Primary | ICD-10-CM

## 2017-06-09 RX ORDER — ERGOCALCIFEROL 1.25 MG/1
50000 CAPSULE ORAL
Qty: 12 CAP | Refills: 2 | Status: SHIPPED | OUTPATIENT
Start: 2017-06-09 | End: 2017-12-06 | Stop reason: ALTCHOICE

## 2017-06-09 NOTE — PROGRESS NOTES
Spoke with patient and after verifying name and date of birth of patient gave her test results per Dr. Luis Alberto Rosa. Patient stated understanding.

## 2017-06-09 NOTE — PROGRESS NOTES
Your labs showed normal kidney and liver tests, blood counts, thyroid, and diabetes screening test. Your vitamin D level is low. Dr. Rodney Ordonez is sending a prescription for once weekly vitamin D tablets to your pharmacy. Your cholesterol is a little high at 218; normal is less than 200. To lower cholesterol, work on diet and exercise. For exercise, aim for 30 minutes of activity 5 days a week. For diet, increase fruits, vegetables, and low-fat dairy products (such as low-fat yogurt, 1% or skim milk), and decrease fried foods, fast foods, and red meats such as beef, mason, sausage, hot dogs, and pork.

## 2017-06-20 ENCOUNTER — OFFICE VISIT (OUTPATIENT)
Dept: INTERNAL MEDICINE CLINIC | Age: 57
End: 2017-06-20

## 2017-06-20 ENCOUNTER — HOSPITAL ENCOUNTER (OUTPATIENT)
Dept: LAB | Age: 57
Discharge: HOME | End: 2017-06-20
Payer: COMMERCIAL

## 2017-06-20 VITALS
TEMPERATURE: 98.8 F | DIASTOLIC BLOOD PRESSURE: 85 MMHG | SYSTOLIC BLOOD PRESSURE: 127 MMHG | HEIGHT: 65 IN | OXYGEN SATURATION: 97 % | BODY MASS INDEX: 22.33 KG/M2 | WEIGHT: 134 LBS | HEART RATE: 76 BPM | RESPIRATION RATE: 16 BRPM

## 2017-06-20 DIAGNOSIS — Z01.419 ENCOUNTER FOR CERVICAL PAP SMEAR WITH PELVIC EXAM: ICD-10-CM

## 2017-06-20 DIAGNOSIS — Z01.419 WELL WOMAN EXAM WITH ROUTINE GYNECOLOGICAL EXAM: Primary | ICD-10-CM

## 2017-06-20 PROCEDURE — 88175 CYTOPATH C/V AUTO FLUID REDO: CPT | Performed by: INTERNAL MEDICINE

## 2017-06-20 PROCEDURE — 87624 HPV HI-RISK TYP POOLED RSLT: CPT | Performed by: INTERNAL MEDICINE

## 2017-06-20 NOTE — PATIENT INSTRUCTIONS
Breast Self-Exam: Care Instructions  Your Care Instructions  A breast self-exam is when you check your breasts for lumps or changes. This regular exam helps you learn how your breasts normally look and feel. Most breast problems or changes are not because of cancer. Breast self-exam is not a substitute for a mammogram. Having regular breast exams by your doctor and regular mammograms improve your chances of finding any problems with your breasts. Some women set a time each month to do a step-by-step breast self-exam. Other women like a less formal system. They might look at their breasts as they brush their teeth, or feel their breasts once in a while in the shower. If you notice a change in your breast, tell your doctor. Follow-up care is a key part of your treatment and safety. Be sure to make and go to all appointments, and call your doctor if you are having problems. Its also a good idea to know your test results and keep a list of the medicines you take. How do you do a breast self-exam?  · The best time to examine your breasts is usually one week after your menstrual period begins. Your breasts should not be tender then. If you do not have periods, you might do your exam on a day of the month that is easy to remember. · To examine your breasts:  ¨ Remove all your clothes above the waist and lie down. When you are lying down, your breast tissue spreads evenly over your chest wall, which makes it easier to feel all your breast tissue. ¨ Use the pads--not the fingertips--of the 3 middle fingers of your left hand to check your right breast. Move your fingers slowly in small coin-sized circles that overlap. ¨ Use three levels of pressure to feel of all your breast tissue. Use light pressure to feel the tissue close to the skin surface. Use medium pressure to feel a little deeper. Use firm pressure to feel your tissue close to your breastbone and ribs.  Use each pressure level to feel your breast tissue before moving on to the next spot. ¨ Check your entire breast, moving up and down as if following a strip from the collarbone to the bra line, and from the armpit to the ribs. Repeat until you have covered the entire breast.  ¨ Repeat this procedure for your left breast, using the pads of the 3 middle fingers of your right hand. · To examine your breasts while in the shower:  ¨ Place one arm over your head and lightly soap your breast on that side. ¨ Using the pads of your fingers, gently move your hand over your breast (in the strip pattern described above), feeling carefully for any lumps or changes. ¨ Repeat for the other breast.  · Have your doctor inspect anything you notice to see if you need further testing. Where can you learn more? Go to http://danica-deejay.info/. Enter P148 in the search box to learn more about \"Breast Self-Exam: Care Instructions. \"  Current as of: July 26, 2016  Content Version: 11.3  © 5796-1943 [email or web address]. Care instructions adapted under license by [employer] (which disclaims liability or warranty for this information). If you have questions about a medical condition or this instruction, always ask your healthcare professional. [place] [address] any warranty or liability for your use of this information. Vitamin D (By mouth)   Vitamin D (VYE-ta-min D)  Maintains calcium and phosphorus in your body and makes your bones strong. This medicine is a vitamin. Brand Name(s): Ione Castleman Vitamin D3, Decara, Delta D3, Dialyvite Vitamin D3 Max, Drisdol, Leader Vitamin D3, Iain Arreola , Murphy Natural Vitamin D, Nature's Blend Super Strength Vitamin D3, Nature's Blend Vitamin D, Nature's Blend Vitamin D3, PharmAssure Vitamin D-3, Biofisicae Aid Vitamin D-3, Cryptmint Naturals Vitamin D3   There may be other brand names for this medicine. When This Medicine Should Not Be Used:    You should not use this medicine if you have had an allergic reaction to vitamin D. How to Use This Medicine:   Tablet, Liquid Filled Capsule  · Your doctor will tell you how much medicine to use. Do not use more than directed. · Follow the instructions on the medicine label if you are using this medicine without a prescription. · It is best to take this medicine with food or milk. · Carefully follow your doctor's instructions about any special diet. If a dose is missed:   · Take a dose as soon as you remember. If it is almost time for your next dose, wait until then and take a regular dose. Do not take extra medicine to make up for a missed dose. How to Store and Dispose of This Medicine:   · Store the medicine in a closed container at room temperature, away from heat, moisture, and direct light. · Ask your pharmacist, doctor, or health caregiver about the best way to dispose of any outdated medicine or medicine no longer needed. · Keep all medicine out of the reach of children. Never share your medicine with anyone. Drugs and Foods to Avoid:   Ask your doctor or pharmacist before using any other medicine, including over-the-counter medicines, vitamins, and herbal products. · Make sure your doctor knows about all other medicines you are using. Warnings While Using This Medicine:   · Make sure your doctor knows if you are pregnant or breast feeding. · Make sure your doctor knows if you have kidney stones, sarcoidosis, or overactive parathyroid gland. · You should not use this medicine if you are under 25years of age. Possible Side Effects While Using This Medicine:   Call your doctor right away if you notice any of these side effects:  · Allergic reaction: Itching or hives, swelling in your face or hands, swelling or tingling in your mouth or throat, chest tightness, trouble breathing  · Change in how much or how often you urinate.   If you notice these less serious side effects, talk with your doctor: · Diarrhea. · Headache. · Weight loss. If you notice other side effects that you think are caused by this medicine, tell your doctor. Call your doctor for medical advice about side effects. You may report side effects to FDA at 6-159-LTB-6587  © 2017 [facility] Information is for End User's use only and may not be sold, redistributed or otherwise used for commercial purposes. The above information is an  only. It is not intended as medical advice for individual conditions or treatments. Talk to your doctor, nurse or pharmacist before following any medical regimen to see if it is safe and effective for you.

## 2017-06-20 NOTE — PROGRESS NOTES
SUBJECTIVE:     59 y.o. female for annual routine Pap and checkup. Current Outpatient Prescriptions   Medication Sig Dispense Refill    ergocalciferol (ERGOCALCIFEROL) 50,000 unit capsule Take 1 Cap by mouth every seven (7) days. Indications: VITAMIN D DEFICIENCY 12 Cap 2    LURASIDONE HCL (LATUDA PO) Take 50 mg by mouth daily.  hydrOXYzine pamoate (VISTARIL) 50 mg capsule Take 50 mg by mouth four (4) times daily as needed. Allergies: Review of patient's allergies indicates no known allergies. Patient's last menstrual period was 02/15/2011. ROS:  No urinary tract symptoms. GYN ROS: normal menses, no abnormal bleeding, pelvic pain or discharge, no breast pain or new or enlarging lumps on self exam.     OBJECTIVE:   The patient appears well, alert, oriented x 3, in no distress. Visit Vitals    /85 (BP 1 Location: Right arm, BP Patient Position: Sitting)    Pulse 76    Temp 98.8 °F (37.1 °C) (Oral)    Resp 16    Ht 5' 5\" (1.651 m)    Wt 134 lb (60.8 kg)    LMP 02/15/2011    SpO2 97%    BMI 22.3 kg/m2       BREAST EXAM: breasts appear normal, no suspicious masses, no skin or nipple changes or axillary nodes    PELVIC EXAM: normal external genitalia, vulva, vagina, cervix, uterus and adnexa      ASSESSMENT AND PLAN    ICD-10-CM ICD-9-CM    1. Well woman exam with routine gynecological exam Z01.419 V72.31    2. Encounter for cervical Pap smear with pelvic exam Z01.419 V76.2 PAP IG, APTIMA HPV AND RFX 16/18,45 ([id number])     V72.31        Mark Jorge was seen today for gyn exam.    Diagnoses and all orders for this visit:    Well woman exam with routine gynecological exam  Normal breast exam.    Encounter for cervical Pap smear with pelvic exam  -     PAP IG, APTIMA HPV AND RFX 16/18,45 ([id number])      Follow-up Disposition:  Return in about 1 month (around 7/20/2017), or if symptoms worsen or fail to improve, for Elevated BP, depression/anxiety.

## 2017-06-20 NOTE — PROGRESS NOTES
Reviewed record  In preparation for visit and have obtained necessary documentation. 1. Have you been to the ER, urgent care clinic since your last visit? Hospitalized since your last visit?no  2. Have you seen or consulted any other health care providers outside of the [address] [place] [address] since your last visit? Include any pap smears or colon screening. Saw Dr Mark Bentley for her hand and OT at [address] [place] [address]  Patient has been given information on advanced directives at a previous visit.

## 2017-06-20 NOTE — MR AVS SNAPSHOT
Visit Information Date & Time Provider Department Dept. Phone Encounter #  
 6/20/2017  1:00 PM Luisa Betts [facility] [phone] [id number] Upcoming Health Maintenance Date Due Hepatitis C Screening 1960 FOBT Q 1 YEAR AGE 50-75 6/13/2008 BREAST CANCER SCRN MAMMOGRAM 2/11/2014 PAP AKA CERVICAL CYTOLOGY 9/23/2017 INFLUENZA AGE 9 TO ADULT 8/1/2017 DTaP/Tdap/Td series (2 - Td) 2/1/2022 Allergies as of 6/20/2017  Review Complete On: 6/20/2017 By: Hilda Lazaro MD  
 No Known Allergies Current Immunizations  Reviewed on 9/23/2014 Name Date HEP A/HEP B Combined Vaccine 3/7/2012, 2/1/2012 TD Vaccine 2/1/1998 TDAP Vaccine 2/1/2012 Not reviewed this visit You Were Diagnosed With   
  
 Codes Comments Well woman exam with routine gynecological exam    -  Primary ICD-10-CM: R43.818 ICD-9-CM: V72.31 Encounter for cervical Pap smear with pelvic exam     ICD-10-CM: C50.088 ICD-9-CM: V76.2, V72.31 Vitals BP Pulse Temp Resp Height(growth percentile) Weight(growth percentile) 127/85 (BP 1 Location: Right arm, BP Patient Position: Sitting) 76 98.8 °F (37.1 °C) (Oral) 16 5' 5\" (1.651 m) 134 lb (60.8 kg) LMP SpO2 BMI OB Status Smoking Status 02/15/2011 97% 22.3 kg/m2 Postmenopausal Current Every Day Smoker BMI and BSA Data Body Mass Index Body Surface Area  
 22.3 kg/m 2 1.67 m 2 Preferred Pharmacy Pharmacy Name Phone [id number] [address] [address] [address] [phone] Your Updated Medication List  
  
   
This list is accurate as of: 6/20/17  1:27 PM.  Always use your most recent med list.  
  
  
  
  
 ergocalciferol 50,000 unit capsule Commonly known as:  ERGOCALCIFEROL Take 1 Cap by mouth every seven (7) days. Indications: VITAMIN D DEFICIENCY  
  
 hydrOXYzine pamoate 50 mg capsule Commonly known as:  VISTARIL  
 Take 50 mg by mouth four (4) times daily as needed. LATUDA PO Take 50 mg by mouth daily. We Performed the Following PAP IG, APTIMA HPV AND RFX 16/18,45 ([id number]) [[id number] Custom] Patient Instructions Breast Self-Exam: Care Instructions Your Care Instructions A breast self-exam is when you check your breasts for lumps or changes. This regular exam helps you learn how your breasts normally look and feel. Most breast problems or changes are not because of cancer. Breast self-exam is not a substitute for a mammogram. Having regular breast exams by your doctor and regular mammograms improve your chances of finding any problems with your breasts. Some women set a time each month to do a step-by-step breast self-exam. Other women like a less formal system. They might look at their breasts as they brush their teeth, or feel their breasts once in a while in the shower. If you notice a change in your breast, tell your doctor. Follow-up care is a key part of your treatment and safety. Be sure to make and go to all appointments, and call your doctor if you are having problems. Its also a good idea to know your test results and keep a list of the medicines you take. How do you do a breast self-exam? 
· The best time to examine your breasts is usually one week after your menstrual period begins. Your breasts should not be tender then. If you do not have periods, you might do your exam on a day of the month that is easy to remember. · To examine your breasts: ¨ Remove all your clothes above the waist and lie down. When you are lying down, your breast tissue spreads evenly over your chest wall, which makes it easier to feel all your breast tissue. ¨ Use the padsnot the fingertipsof the 3 middle fingers of your left hand to check your right breast. Move your fingers slowly in small coin-sized circles that overlap. ¨ Use three levels of pressure to feel of all your breast tissue. Use light pressure to feel the tissue close to the skin surface. Use medium pressure to feel a little deeper. Use firm pressure to feel your tissue close to your breastbone and ribs. Use each pressure level to feel your breast tissue before moving on to the next spot. ¨ Check your entire breast, moving up and down as if following a strip from the collarbone to the bra line, and from the armpit to the ribs. Repeat until you have covered the entire breast. 
¨ Repeat this procedure for your left breast, using the pads of the 3 middle fingers of your right hand. · To examine your breasts while in the shower: 
¨ Place one arm over your head and lightly soap your breast on that side. ¨ Using the pads of your fingers, gently move your hand over your breast (in the strip pattern described above), feeling carefully for any lumps or changes. ¨ Repeat for the other breast. 
· Have your doctor inspect anything you notice to see if you need further testing. Where can you learn more? Go to http://danica-deejay.info/. Enter P148 in the search box to learn more about \"Breast Self-Exam: Care Instructions. \" Current as of: July 26, 2016 Content Version: 11.3 © 3359-8913 [employer]. Care instructions adapted under license by Rev[employer] (which disclaims liability or warranty for this information). If you have questions about a medical condition or this instruction, always ask your healthcare professional. [place] [address] any warranty or liability for your use of this information. Vitamin D (By mouth) Vitamin D (VYE-ta-min D) Maintains calcium and phosphorus in your body and makes your bones strong. This medicine is a vitamin.   
Brand Name(s): Pippa Rondon Vitamin D3, Ely Brown D3, Dialyvite Vitamin D3 Max, Drisdol, Leader Vitamin D3, Kathia Oxford , Murphy Natural Vitamin D, Activ Technologies's [facility] [employer] Strength Vitamin D3, Nature's Blend Vitamin D, Nature's Blend Vitamin D3, PharmALumaCyteure Vitamin D-3, Miradae Aid Vitamin D-3, Kittery Naturals Vitamin D3 There may be other brand names for this medicine. When This Medicine Should Not Be Used: You should not use this medicine if you have had an allergic reaction to vitamin D. How to Use This Medicine:  
Tablet, Liquid Filled Capsule · Your doctor will tell you how much medicine to use. Do not use more than directed. · Follow the instructions on the medicine label if you are using this medicine without a prescription. · It is best to take this medicine with food or milk. · Carefully follow your doctor's instructions about any special diet. If a dose is missed: · Take a dose as soon as you remember. If it is almost time for your next dose, wait until then and take a regular dose. Do not take extra medicine to make up for a missed dose. How to Store and Dispose of This Medicine: · Store the medicine in a closed container at room temperature, away from heat, moisture, and direct light. · Ask your pharmacist, doctor, or health caregiver about the best way to dispose of any outdated medicine or medicine no longer needed. · Keep all medicine out of the reach of children. Never share your medicine with anyone. Drugs and Foods to Avoid: Ask your doctor or pharmacist before using any other medicine, including over-the-counter medicines, vitamins, and herbal products. · Make sure your doctor knows about all other medicines you are using. Warnings While Using This Medicine: · Make sure your doctor knows if you are pregnant or breast feeding. · Make sure your doctor knows if you have kidney stones, sarcoidosis, or overactive parathyroid gland. · You should not use this medicine if you are under 25years of age. Possible Side Effects While Using This Medicine: Call your doctor right away if you notice any of these side effects: · Allergic reaction: Itching or hives, swelling in your face or hands, swelling or tingling in your mouth or throat, chest tightness, trouble breathing · Change in how much or how often you urinate. If you notice these less serious side effects, talk with your doctor: · Diarrhea. · Headache. · Weight loss. If you notice other side effects that you think are caused by this medicine, tell your doctor. Call your doctor for medical advice about side effects. You may report side effects to FDA at 5-818-ZRH-4317 © 2017 [address] Information is for End User's use only and may not be sold, redistributed or otherwise used for commercial purposes. The above information is an  only. It is not intended as medical advice for individual conditions or treatments. Talk to your doctor, nurse or pharmacist before following any medical regimen to see if it is safe and effective for you. Introducing [facility] & HEALTH SERVICES! An Huang introduces PrePayMe patient portal. Now you can access parts of your medical record, email your doctor's office, and request medication refills online. 1. In your internet browser, go to [email or web address] 2. Click on the First Time User? Click Here link in the Sign In box. You will see the New Member Sign Up page. 3. Enter your PrePayMe Access Code exactly as it appears below. You will not need to use this code after youve completed the sign-up process. If you do not sign up before the expiration date, you must request a new code. · PrePayMe Access Code: [email or web address] Expires: 8/29/2017  2:56 PM 
 
4. Enter the last four digits of your Social Security Number (xxxx) and Date of Birth (mm/dd/yyyy) as indicated and click Submit. You will be taken to the next sign-up page. 5. Create a PrePayMe ID.  This will be your PrePayMe login ID and cannot be changed, so think of one that is secure and easy to remember. 6. Create a Bubbles and Beyond password. You can change your password at any time. 7. Enter your Password Reset Question and Answer. This can be used at a later time if you forget your password. 8. Enter your e-mail address. You will receive e-mail notification when new information is available in [address] 9. Click Sign Up. You can now view and download portions of your medical record. 10. Click the Download Summary menu link to download a portable copy of your medical information. If you have questions, please visit the Frequently Asked Questions section of the Bubbles and Beyond website. Remember, Bubbles and Beyond is NOT to be used for urgent needs. For medical emergencies, dial 911. Now available from your iPhone and Android! Please provide this summary of care documentation to your next provider. Your primary care clinician is listed as Christian Bliss. If you have any questions after today's visit, please call [phone].

## 2017-06-23 NOTE — PROGRESS NOTES
Spoke with patient and after verifying name and date of birth of patient gave her test results per Dr. Farshad Hurst. Patient stated understanding.

## 2017-09-20 PROBLEM — S62.612A CLOSED DISPLACED FRACTURE OF PROXIMAL PHALANX OF RIGHT MIDDLE FINGER: Status: ACTIVE | Noted: 2017-09-20

## 2017-11-17 ENCOUNTER — OFFICE VISIT (OUTPATIENT)
Dept: INTERNAL MEDICINE CLINIC | Age: 57
End: 2017-11-17

## 2017-11-17 VITALS
WEIGHT: 136 LBS | BODY MASS INDEX: 22.66 KG/M2 | HEIGHT: 65 IN | OXYGEN SATURATION: 98 % | HEART RATE: 67 BPM | DIASTOLIC BLOOD PRESSURE: 75 MMHG | RESPIRATION RATE: 16 BRPM | SYSTOLIC BLOOD PRESSURE: 114 MMHG | TEMPERATURE: 97.6 F

## 2017-11-17 DIAGNOSIS — F41.9 ANXIETY: ICD-10-CM

## 2017-11-17 DIAGNOSIS — Z12.39 SCREENING FOR BREAST CANCER: ICD-10-CM

## 2017-11-17 DIAGNOSIS — Z12.11 SCREENING FOR COLON CANCER: ICD-10-CM

## 2017-11-17 DIAGNOSIS — E78.2 MIXED HYPERLIPIDEMIA: ICD-10-CM

## 2017-11-17 DIAGNOSIS — E55.9 VITAMIN D DEFICIENCY: ICD-10-CM

## 2017-11-17 DIAGNOSIS — Z11.59 NEED FOR HEPATITIS C SCREENING TEST: ICD-10-CM

## 2017-11-17 DIAGNOSIS — H81.12 BENIGN PAROXYSMAL POSITIONAL VERTIGO OF LEFT EAR: Primary | ICD-10-CM

## 2017-11-17 RX ORDER — MECLIZINE HYDROCHLORIDE 25 MG/1
25 TABLET ORAL
Qty: 30 TAB | Refills: 0 | Status: SHIPPED | OUTPATIENT
Start: 2017-11-17 | End: 2017-11-27

## 2017-11-17 RX ORDER — HYDROXYZINE PAMOATE 50 MG/1
50 CAPSULE ORAL
Qty: 60 CAP | Refills: 1 | Status: SHIPPED | OUTPATIENT
Start: 2017-11-17 | End: 2019-10-17

## 2017-11-17 NOTE — PATIENT INSTRUCTIONS
Benign Paroxysmal Positional Vertigo (BPPV): Care Instructions  Your Care Instructions    Benign paroxysmal positional vertigo, also called BPPV, is an inner ear problem. It causes a spinning or whirling sensation when you move your head. This sensation is called vertigo. The vertigo usually lasts for less than a minute. People often have vertigo spells for a few days or weeks. Then the vertigo goes away. But it may come back again. The vertigo may be mild, or it may be bad enough to cause unsteadiness, nausea, and vomiting. When you move, your inner ear sends messages to the brain. This helps you keep your balance. Vertigo can happen when debris builds up in the inner ear. The buildup can cause the inner ear to send the wrong message to the brain. Your doctor may move you in different positions to help your vertigo get better faster. This is called the Epley maneuver. Your doctor may also prescribe medicines or exercises to help with your symptoms. Follow-up care is a key part of your treatment and safety. Be sure to make and go to all appointments, and call your doctor if you are having problems. It's also a good idea to know your test results and keep a list of the medicines you take. How can you care for yourself at home? · If your doctor suggests that you do Garcia-Daroff exercises:  ¨ Sit on the edge of a bed or sofa. Quickly lie down on the side that causes the worst vertigo. Lie on your side with your ear down. ¨ Stay in this position for at least 30 seconds or until the vertigo goes away. ¨ Sit up. If this causes vertigo, wait for it to stop. ¨ Repeat the procedure on the other side. ¨ Repeat this 10 times. Do these exercises 2 times a day until the vertigo is gone. When should you call for help? Call 911 anytime you think you may need emergency care. For example, call if:  ? · You have symptoms of a stroke.  These may include:  ¨ Sudden numbness, tingling, weakness, or loss of movement in your face, arm, or leg, especially on only one side of your body. ¨ Sudden vision changes. ¨ Sudden trouble speaking. ¨ Sudden confusion or trouble understanding simple statements. ¨ Sudden problems with walking or balance. ¨ A sudden, severe headache that is different from past headaches. ?Call your doctor now or seek immediate medical care if:  ? · You have new or worse nausea and vomiting. ? · You have new symptoms such as hearing loss or roaring in your ears. ? Watch closely for changes in your health, and be sure to contact your doctor if:  ? · You are not getting better as expected. ? · Your vertigo gets worse. Where can you learn more? Go to http://danica-deejay.info/. Enter  in the search box to learn more about \"Benign Paroxysmal Positional Vertigo (BPPV): Care Instructions. \"  Current as of: May 12, 2017  Content Version: 11.4  © 2485-4672 Dining[employer]le. Care instructions adapted under license by [employer] (which disclaims liability or warranty for this information). If you have questions about a medical condition or this instruction, always ask your healthcare professional. [place] [address] any warranty or liability for your use of this information.

## 2017-11-17 NOTE — MR AVS SNAPSHOT
Visit Information Date & Time Provider Department Dept. Phone Encounter #  
 11/17/2017  2:00 PM [phone] [id number] Follow-up Instructions Return in about 3 months (around 2/17/2018), or if symptoms worsen or fail to improve, for Anxiety, dizziness. Upcoming Health Maintenance Date Due Hepatitis C Screening 1960 FOBT Q 1 YEAR AGE 50-75 6/13/2008 BREAST CANCER SCRN MAMMOGRAM 2/11/2014 PAP AKA CERVICAL CYTOLOGY 6/20/2020 DTaP/Tdap/Td series (2 - Td) 2/1/2022 Allergies as of 11/17/2017  Review Complete On: 11/17/2017 By: Trisha Aguilar MD  
 No Known Allergies Current Immunizations  Reviewed on 9/23/2014 Name Date HEP A/HEP B Combined Vaccine 3/7/2012, 2/1/2012 TD Vaccine 2/1/1998 TDAP Vaccine 2/1/2012 Not reviewed this visit You Were Diagnosed With   
  
 Codes Comments Benign paroxysmal positional vertigo of left ear    -  Primary ICD-10-CM: H81.12 
ICD-9-CM: 386.11 Vitamin D deficiency     ICD-10-CM: E55.9 ICD-9-CM: 268.9 Mixed hyperlipidemia     ICD-10-CM: E78.2 ICD-9-CM: 272.2 Screening for colon cancer     ICD-10-CM: Z12.11 ICD-9-CM: V76.51 Screening for breast cancer     ICD-10-CM: Z12.31 
ICD-9-CM: V76.10 Need for hepatitis C screening test     ICD-10-CM: Z11.59 
ICD-9-CM: V73.89 Anxiety     ICD-10-CM: F41.9 ICD-9-CM: 300.00 Vitals BP Pulse Temp Resp Height(growth percentile) Weight(growth percentile) 114/75 (BP 1 Location: Right arm, BP Patient Position: Sitting) 67 97.6 °F (36.4 °C) (Oral) 16 5' 5\" (1.651 m) 136 lb (61.7 kg) LMP SpO2 BMI OB Status Smoking Status 02/15/2011 98% 22.63 kg/m2 Postmenopausal Current Every Day Smoker Vitals History BMI and BSA Data Body Mass Index Body Surface Area  
 22.63 kg/m 2 1.68 m 2 Preferred Pharmacy Pharmacy Name Phone [id number] [address] [phone] Your Updated Medication List  
  
   
This list is accurate as of: 11/17/17  2:40 PM.  Always use your most recent med list.  
  
  
  
  
 ergocalciferol 50,000 unit capsule Commonly known as:  ERGOCALCIFEROL Take 1 Cap by mouth every seven (7) days. Indications: VITAMIN D DEFICIENCY  
  
 hydrOXYzine pamoate 50 mg capsule Commonly known as:  VISTARIL Take 1 Cap by mouth four (4) times daily as needed. LATUDA PO Take 50 mg by mouth daily. meclizine 25 mg tablet Commonly known as:  ANTIVERT Take 1 Tab by mouth three (3) times daily as needed for up to 10 days. Do not take on same day as Vistaril  Indications: Vertigo Prescriptions Sent to Pharmacy Refills  
 hydrOXYzine pamoate (VISTARIL) 50 mg capsule 1 Sig: Take 1 Cap by mouth four (4) times daily as needed. Class: Normal  
 [id number] [address], [address] Ph #: [phone] Route: Oral  
 meclizine (ANTIVERT) 25 mg tablet 0 Sig: Take 1 Tab by mouth three (3) times daily as needed for up to 10 days. Do not take on same day as Vistaril  Indications: Vertigo Class: Normal  
 [id number] [address], [address] Ph #: [phone] Route: Oral  
  
We Performed the Following HEPATITIS C AB [59396 CPT(R)] LIPID PANEL [50064 CPT(R)] VITAMIN D, 25 HYDROXY [id number] CPT(R)] Follow-up Instructions Return in about 3 months (around 2/17/2018), or if symptoms worsen or fail to improve, for Anxiety, dizziness. To-Do List   
 12/17/2017 Lab:  OCCULT BLOOD, IMMUNOASSAY (FIT)   
  
 01/14/2018 Imaging:  AHNSA MAMMO BI SCREENING INCL CAD Patient Instructions Benign Paroxysmal Positional Vertigo (BPPV): Care Instructions Your Care Instructions Benign paroxysmal positional vertigo, also called BPPV, is an inner ear problem. It causes a spinning or whirling sensation when you move your head. This sensation is called vertigo. The vertigo usually lasts for less than a minute. People often have vertigo spells for a few days or weeks. Then the vertigo goes away. But it may come back again. The vertigo may be mild, or it may be bad enough to cause unsteadiness, nausea, and vomiting. When you move, your inner ear sends messages to the brain. This helps you keep your balance. Vertigo can happen when debris builds up in the inner ear. The buildup can cause the inner ear to send the wrong message to the brain. Your doctor may move you in different positions to help your vertigo get better faster. This is called the Epley maneuver. Your doctor may also prescribe medicines or exercises to help with your symptoms. Follow-up care is a key part of your treatment and safety. Be sure to make and go to all appointments, and call your doctor if you are having problems. It's also a good idea to know your test results and keep a list of the medicines you take. How can you care for yourself at home? · If your doctor suggests that you do Garcia-Daroff exercises: ¨ Sit on the edge of a bed or sofa. Quickly lie down on the side that causes the worst vertigo. Lie on your side with your ear down. ¨ Stay in this position for at least 30 seconds or until the vertigo goes away. ¨ Sit up. If this causes vertigo, wait for it to stop. ¨ Repeat the procedure on the other side. ¨ Repeat this 10 times. Do these exercises 2 times a day until the vertigo is gone. When should you call for help? Call 911 anytime you think you may need emergency care. For example, call if: 
? · You have symptoms of a stroke. These may include: 
¨ Sudden numbness, tingling, weakness, or loss of movement in your face, arm, or leg, especially on only one side of your body. ¨ Sudden vision changes. ¨ Sudden trouble speaking. ¨ Sudden confusion or trouble understanding simple statements. ¨ Sudden problems with walking or balance. ¨ A sudden, severe headache that is different from past headaches. ?Call your doctor now or seek immediate medical care if: 
? · You have new or worse nausea and vomiting. ? · You have new symptoms such as hearing loss or roaring in your ears. ? Watch closely for changes in your health, and be sure to contact your doctor if: 
? · You are not getting better as expected. ? · Your vertigo gets worse. Where can you learn more? Go to http://danica-deejay.info/. Enter  in the search box to learn more about \"Benign Paroxysmal Positional Vertigo (BPPV): Care Instructions. \" Current as of: May 12, 2017 Content Version: 11.4 © 9394-9565 Healthwise, [employer]. Care instructions adapted under license by [employer] (which disclaims liability or warranty for this information). If you have questions about a medical condition or this instruction, always ask your healthcare professional. [place] [address] any warranty or liability for your use of this information. Introducing [facility] & HEALTH SERVICES! Madison Zendejas introduces Clay.io patient portal. Now you can access parts of your medical record, email your doctor's office, and request medication refills online. 1. In your internet browser, go to [email or web address] 2. Click on the First Time User? Click Here link in the Sign In box. You will see the New Member Sign Up page. 3. Enter your Clay.io Access Code exactly as it appears below. You will not need to use this code after youve completed the sign-up process. If you do not sign up before the expiration date, you must request a new code. · Clay.io Access Code: [email or web address] Expires: 2/15/2018  2:40 PM 
 
4.  Enter the last four digits of your Social Security Number (xxxx) and Date of Birth (mm/dd/yyyy) as indicated and click Submit. You will be taken to the next sign-up page. 5. Create a Airec ID. This will be your Airec login ID and cannot be changed, so think of one that is secure and easy to remember. 6. Create a Airec password. You can change your password at any time. 7. Enter your Password Reset Question and Answer. This can be used at a later time if you forget your password. 8. Enter your e-mail address. You will receive e-mail notification when new information is available in [address] 9. Click Sign Up. You can now view and download portions of your medical record. 10. Click the Download Summary menu link to download a portable copy of your medical information. If you have questions, please visit the Frequently Asked Questions section of the Airec website. Remember, Airec is NOT to be used for urgent needs. For medical emergencies, dial 911. Now available from your iPhone and Android! Please provide this summary of care documentation to your next provider. Your primary care clinician is listed as April Russell. If you have any questions after today's visit, please call [phone].

## 2017-11-17 NOTE — PROGRESS NOTES
CC:   Chief Complaint   Patient presents with    Dizziness       HISTORY OF PRESENT ILLNESS  Arben Jaeger is a 59 y.o. female. Patient complains of dizziness that started 2 weeks ago while vacationing at [facility]; did much swimming, weather conditions were windy. Gets dizzy when she gets up from bed or bends forward. Dizzy spells intermittent, daily, lasts for 30 sec to 1 min at a time, associated with spinning sensation. Denies associated nausea, ear pain, hearing loss, headache, weakness, numbness, visual changes, fevers, or chills. No alleviating factors. Denies new medications or having any previous similar dizziness. She has depression with anxiety, alcohol dependence in remission  and hyperlipidemia. Stopped taking Latuda. Needs refill on Vistaril for anxiety. No longer following with Dr. Cassie Enriquez. Was told she needs to find another psychiatrist since she has [employer]. Reports her insurance will be changing next year and she may no longer be able to be seen at this clinic. Soc Hx  . Has 3 children and 1 grandchild. Lives alone in a house. Unemployed 3 times over past 11 months. She has her own [employer]. History of alcohol abuse; sober for 8 months. Goes to [employer] once daily. Does yoga for exercise.  Walks dog 1.5 hours a day.      Patient Active Problem List   Diagnosis Code    Pure hypercholesterolemia E78.00    Elevated blood pressure reading without diagnosis of hypertension R03.0    Depression F32.9    Anxiety state, unspecified F41.1    Insomnia G47.00    Vitamin D deficiency E55.9    Closed displaced fracture of proximal phalanx of right middle finger S62.612A     Past Medical History:   Diagnosis Date    ASCUS on Pap smear 1998    Chickenpox childhood    Clavicle fracture 1968    Closed fracture of tibial plateau 53/0031    Right.  ([address])    Depression with anxiety 2011    Dr. Mago Worthington Fibrocystic breast     bilateral    High cholesterol 04/2007 No Known Allergies     Current Outpatient Prescriptions   Medication Sig Dispense Refill    ergocalciferol (ERGOCALCIFEROL) 50,000 unit capsule Take 1 Cap by mouth every seven (7) days. Indications: VITAMIN D DEFICIENCY 12 Cap 2    LURASIDONE HCL (LATUDA PO) Take 50 mg by mouth daily.  hydrOXYzine pamoate (VISTARIL) 50 mg capsule Take 50 mg by mouth four (4) times daily as needed. PHYSICAL EXAM  Visit Vitals    /75 (BP 1 Location: Right arm, BP Patient Position: Sitting)    Pulse 67    Temp 97.6 °F (36.4 °C) (Oral)    Resp 16    Ht 5' 5\" (1.651 m)    Wt 136 lb (61.7 kg)    LMP 02/15/2011    SpO2 98%    BMI 22.63 kg/m2       General: Well-developed and well-nourished, no distress. HEENT:  Head normocephalic/atraumatic. Oropharynx benign. No sinus tenderness. TM's and ear canals normal bilaterally. Neck: Supple. No lymphadenopathy. Lungs:  Clear to ausculation bilaterally. Good air movement. Heart:  Regular rate and rhythm, normal S1 and S2, no murmur, gallop, or rub  Extremities: No clubbing, cyanosis, or edema. Neurological: Alert and oriented. Positive left-sided Ohlman-Hallpike maneuver. Psychiatric: Normal mood and affect. Behavior is normal.         ASSESSMENT AND PLAN    ICD-10-CM ICD-9-CM    1. Benign paroxysmal positional vertigo of left ear H81.12 386.11 meclizine (ANTIVERT) 25 mg tablet   2. Vitamin D deficiency E55.9 268.9 VITAMIN D, 25 HYDROXY   3. Mixed hyperlipidemia E78.2 272.2 LIPID PANEL   4. Screening for colon cancer Z12.11 V76.51 OCCULT BLOOD, IMMUNOASSAY (FIT)   5. Screening for breast cancer Z12.31 V76.10 HANSA MAMMO BI SCREENING INCL CAD   6. Need for hepatitis C screening test Z11.59 V73.89 HEPATITIS C AB   7. Anxiety F41.9 300.00 hydrOXYzine pamoate (VISTARIL) 50 mg capsule       Diagnoses and all orders for this visit:    1. Benign paroxysmal positional vertigo of left ear  -     Start meclizine (ANTIVERT) 25 mg tablet;  Take 1 Tab by mouth three (3) times daily as needed for up to 10 days. Do not take on same day as Vistaril  Indications: Vertigo    2. Vitamin D deficiency  -     VITAMIN D, 25 HYDROXY    3. Mixed hyperlipidemia  -     LIPID PANEL    4. Screening for colon cancer  -     OCCULT BLOOD, IMMUNOASSAY (FIT); Future    5. Screening for breast cancer  -     HANSA MAMMO BI SCREENING INCL CAD; Future    6. Need for hepatitis C screening test  -     HEPATITIS C AB    7. Anxiety  -     Refill hydrOXYzine pamoate (VISTARIL) 50 mg capsule; Take 1 Cap by mouth four (4) times daily as needed. Follow-up Disposition:  Return in about 3 months (around 2/17/2018), or if symptoms worsen or fail to improve, for Anxiety, dizziness. Provided patient and/or family with advanced directive information and answered pertinent questions. Encouraged patient to provide a copy of advanced directive to the office when available. I have discussed the diagnosis with the patient and the intended plan as seen in the above orders. Patient is in agreement. The patient has received an after-visit summary and questions were answered concerning future plans. I have discussed medication side effects and warnings with the patient as well.

## 2017-11-17 NOTE — PROGRESS NOTES
Reviewed record  In preparation for visit and have obtained necessary documentation. 1. Have you been to the ER, urgent care clinic since your last visit? Hospitalized since your last visit?no  2. Have you seen or consulted any other health care providers outside of the [address] [place] [address] since your last visit? Include any pap smears or colon screening. no  Advanced directives: Patient has been given information on advanced directives at a previous visit. Patients vital signs discussed with physician.

## 2017-11-18 LAB
25(OH)D3+25(OH)D2 SERPL-MCNC: 36.6 NG/ML (ref 30–100)
CHOLEST SERPL-MCNC: 251 MG/DL (ref 100–199)
HCV AB S/CO SERPL IA: <0.1 S/CO RATIO (ref 0–0.9)
HDLC SERPL-MCNC: 105 MG/DL
INTERPRETATION, 910389: NORMAL
LDLC SERPL CALC-MCNC: 135 MG/DL (ref 0–99)
TRIGL SERPL-MCNC: 53 MG/DL (ref 0–149)
VLDLC SERPL CALC-MCNC: 11 MG/DL (ref 5–40)

## 2017-11-21 NOTE — PROGRESS NOTES
Your labs showed normal vitamin D level, improved from 5 months ago. Your hepatitis C screening test also returned normal. Your cholesterol was high at 251 (normal is less than 200). Five months ago it was 218. Dr. Montse Patterson recommends you start on a cholesterol-lowering medication and stay on a low-fat diet. The cholesterol-lowering medication is called atorvastatin. For a low-cholesterol diet, increase fruits, vegetables, and low-fat dairy products (such as low-fat yogurt, 1% or skim milk), and decrease fried foods, fast foods, and red meats such as beef, mason, sausage, hot dogs, and pork.  [If she is agreeable to atorvastatin, send me rx for atorvastatin 20 mg, take 1 tab by mouth once daily, #30, 5 RF.]

## 2017-11-22 RX ORDER — ATORVASTATIN CALCIUM 20 MG/1
20 TABLET, FILM COATED ORAL DAILY
Qty: 30 TAB | Refills: 5 | Status: SHIPPED | OUTPATIENT
Start: 2017-11-22 | End: 2019-10-17

## 2017-11-22 NOTE — PROGRESS NOTES
Spoke with patient and after verifying name and date of birth of patient gave her test results per Dr. Yesenia Pruitt. Patient stated understanding and is agreeable to starting atorvastatin 20 mg #30 to [facility] per written order of Dr Yesenia Pruitt.

## 2017-11-22 NOTE — TELEPHONE ENCOUNTER
Spoke with patient and after verifying name and date of birth of patient gave her test results per Dr. Roque Roland. Patient stated understanding and is agreeable to starting atorvastatin 20 mg #30 5 refills to [facility] per written order of Dr Roque Roland.

## 2017-12-01 RX ORDER — MECLIZINE HYDROCHLORIDE 25 MG/1
25 TABLET ORAL
Qty: 30 TAB | Refills: 0 | Status: SHIPPED | OUTPATIENT
Start: 2017-12-01 | End: 2017-12-11

## 2017-12-01 NOTE — TELEPHONE ENCOUNTER
Pt called in stating she lost her bottle of the Rx for Vertigo ( pt not sure of the name). Pt stated she lost it on thanksgiving night, thinks someone got into her bag. She would like a new rx send to her pharmacy on file.    Pt would like a call back once this has been done

## 2017-12-01 NOTE — TELEPHONE ENCOUNTER
PCP: Mo Hendrickson MD    Last appt: 11/17/2017  Future Appointments  Date Time Provider Alison Mcclellan   12/6/2017 3:00 PM ED [facility]       Requested Prescriptions     Pending Prescriptions Disp Refills    meclizine (ANTIVERT) 25 mg tablet 30 Tab 0     Sig: Take 1 Tab by mouth three (3) times daily as needed for up to 10 days.

## 2017-12-06 ENCOUNTER — HOSPITAL ENCOUNTER (OUTPATIENT)
Dept: MAMMOGRAPHY | Age: 57
Discharge: HOME | End: 2017-12-06
Attending: INTERNAL MEDICINE
Payer: COMMERCIAL

## 2017-12-06 ENCOUNTER — OFFICE VISIT (OUTPATIENT)
Dept: INTERNAL MEDICINE CLINIC | Age: 57
End: 2017-12-06

## 2017-12-06 VITALS
HEART RATE: 64 BPM | SYSTOLIC BLOOD PRESSURE: 117 MMHG | BODY MASS INDEX: 22.66 KG/M2 | OXYGEN SATURATION: 99 % | DIASTOLIC BLOOD PRESSURE: 85 MMHG | WEIGHT: 136 LBS | RESPIRATION RATE: 18 BRPM | HEIGHT: 65 IN

## 2017-12-06 DIAGNOSIS — Z12.39 SCREENING FOR BREAST CANCER: ICD-10-CM

## 2017-12-06 DIAGNOSIS — Z12.11 SCREENING FOR COLON CANCER: ICD-10-CM

## 2017-12-06 DIAGNOSIS — R42 DIZZINESS: Primary | ICD-10-CM

## 2017-12-06 PROCEDURE — 77067 SCR MAMMO BI INCL CAD: CPT

## 2017-12-06 NOTE — PROGRESS NOTES
Oz Roldan  Identified pt with two pt identifiers(name and ). Chief Complaint   Patient presents with    Dizziness     X 1 month       1. Have you been to the ER, urgent care clinic since your last visit? Hospitalized since your last visit? NO    2. Have you seen or consulted any other health care providers outside of the [address] [place] [address] since your last visit? Include any pap smears or colon screening.  NO      Dr Hayde Turner notified of reason for visit and vitals    Patient received paperwork for advance directive during previous visit but has not completed at this time     Reviewed record In preparation for visit, huddled with provider and have obtained necessary documentation      Health Maintenance Due   Topic    FOBT Q 1 YEAR AGE 50-75     BREAST CANCER SCRN MAMMOGRAM        Wt Readings from Last 3 Encounters:   17 136 lb (61.7 kg)   17 136 lb (61.7 kg)   17 134 lb (60.8 kg)     Temp Readings from Last 3 Encounters:   17 97.6 °F (36.4 °C) (Oral)   17 98.8 °F (37.1 °C) (Oral)   17 98.8 °F (37.1 °C) (Oral)     BP Readings from Last 3 Encounters:   17 117/85   17 114/75   17 127/85     Pulse Readings from Last 3 Encounters:   17 64   17 67   17 76         Learning Assessment:  :     Learning Assessment 2017   PRIMARY LEARNER Patient   HIGHEST LEVEL OF EDUCATION - PRIMARY LEARNER  2 YEARS OF COLLEGE   BARRIERS PRIMARY LEARNER NONE   CO-LEARNER CAREGIVER No   PRIMARY LANGUAGE ENGLISH   LEARNER PREFERENCE PRIMARY LISTENING   ANSWERED BY self   RELATIONSHIP SELF       Depression Screening:  :     PHQ over the last two weeks 2017   Little interest or pleasure in doing things Not at all   Feeling down, depressed or hopeless Not at all   Total Score PHQ 2 0             Patient is accompanied by self I have received verbal consent from Oz Roldan to discuss any/all medical information while they are present in the room.    Medication reconciliation up to date and corrected with patient at this time.

## 2017-12-06 NOTE — PATIENT INSTRUCTIONS
Dizziness: Care Instructions  Your Care Instructions  Dizziness is the feeling of unsteadiness or fuzziness in your head. It is different than having vertigo, which is a feeling that the room is spinning or that you are moving or falling. It is also different from lightheadedness, which is the feeling that you are about to faint. It can be hard to know what causes dizziness. Some people feel dizzy when they have migraine headaches. Sometimes bouts of flu can make you feel dizzy. Some medical conditions, such as heart problems or high blood pressure, can make you feel dizzy. Many medicines can cause dizziness, including medicines for high blood pressure, pain, or anxiety. If a medicine causes your symptoms, your doctor may recommend that you stop or change the medicine. If it is a problem with your heart, you may need medicine to help your heart work better. If there is no clear reason for your symptoms, your doctor may suggest watching and waiting for a while to see if the dizziness goes away on its own. Follow-up care is a key part of your treatment and safety. Be sure to make and go to all appointments, and call your doctor if you are having problems. It's also a good idea to know your test results and keep a list of the medicines you take. How can you care for yourself at home? · If your doctor recommends or prescribes medicine, take it exactly as directed. Call your doctor if you think you are having a problem with your medicine. · Do not drive while you feel dizzy. · Try to prevent falls. Steps you can take include:  ¨ Using nonskid mats, adding grab bars near the tub, and using night-lights. ¨ Clearing your home so that walkways are free of anything you might trip on. ¨ Letting family and friends know that you have been feeling dizzy. This will help them know how to help you. When should you call for help? Call 911 anytime you think you may need emergency care.  For example, call if:  ? · You passed out (lost consciousness). ? · You have dizziness along with symptoms of a heart attack. These may include:  ¨ Chest pain or pressure, or a strange feeling in the chest.  ¨ Sweating. ¨ Shortness of breath. ¨ Nausea or vomiting. ¨ Pain, pressure, or a strange feeling in the back, neck, jaw, or upper belly or in one or both shoulders or arms. ¨ Lightheadedness or sudden weakness. ¨ A fast or irregular heartbeat. ? · You have symptoms of a stroke. These may include:  ¨ Sudden numbness, tingling, weakness, or loss of movement in your face, arm, or leg, especially on only one side of your body. ¨ Sudden vision changes. ¨ Sudden trouble speaking. ¨ Sudden confusion or trouble understanding simple statements. ¨ Sudden problems with walking or balance. ¨ A sudden, severe headache that is different from past headaches. ?Call your doctor now or seek immediate medical care if:  ? · You feel dizzy and have a fever, headache, or ringing in your ears. ? · You have new or increased nausea and vomiting. ? · Your dizziness does not go away or comes back. ? Watch closely for changes in your health, and be sure to contact your doctor if:  ? · You do not get better as expected. Where can you learn more? Go to http://danica-deejay.info/. Enter Y187 in the search box to learn more about \"Dizziness: Care Instructions. \"  Current as of: March 20, 2017  Content Version: 11.4  © 6338-9745 [employer]. Care instructions adapted under license by [employer] (which disclaims liability or warranty for this information). If you have questions about a medical condition or this instruction, always ask your healthcare professional. [place] [address] any warranty or liability for your use of this information.

## 2017-12-06 NOTE — PROGRESS NOTES
CC:   Chief Complaint   Patient presents with    Dizziness     X 1 month       HISTORY OF PRESENT ILLNESS  Blaise Abdi is a 59 y.o. female. Patient complains of worsening dizziness. Now feeling dizzy all the time. Has had a cold for 5 days that seems to be improving. Meclizine helps the dizziness but she is concerned that it is not resolving. Dizziness started while vacationing at [facility]; did much swimming, weather conditions were windy. Gets dizzy when she gets up from bed or bends forward. Dizzy spells intermittent, daily, lasts for 30 sec to 1 min at a time, associated with spinning sensation, occur when getting up from bed or bending forward. Denies associated nausea, ear pain, hearing loss, Tinnitus, headache, weakness, numbness, visual changes, fevers, or chills. No alleviating factors. Denies new medications or having any previous similar episodes of dizziness.     She has depression with anxiety, alcohol dependence in remission  and hyperlipidemia. On Vistaril for anxiety. No longer on Latuda. No longer following with Dr. Frances Rodriguez. Was told she needs to find another psychiatrist since she has [employer]. Reports her insurance will be changing next year and she may no longer be able to be seen at this clinic.     Soc Hx  . Has 3 children and 1 grandchild. Lives alone in a house. Unemployed 3 times over past 11 months. She has her own [employer]. History of alcohol abuse; sober for 8 months. Goes to [employer] once daily. Does yoga for exercise. Walks dog 1.5 hours a day.       Patient Active Problem List   Diagnosis Code    Pure hypercholesterolemia E78.00    Elevated blood pressure reading without diagnosis of hypertension R03.0    Depression F32.9    Anxiety state, unspecified F41.1    Insomnia G47.00    Vitamin D deficiency E55.9    Closed displaced fracture of proximal phalanx of right middle finger S62.612A     Past Medical History:   Diagnosis Date    ASCUS on Pap smear 1998    Chickenpox childhood    Clavicle fracture 1968    Closed fracture of tibial plateau 43/8861    Right.  [facility])    Depression with anxiety 2011    Dr. Mago Worthington Fibrocystic breast     bilateral    High cholesterol 04/2007     No Known Allergies  Current Outpatient Prescriptions   Medication Sig Dispense Refill    meclizine (ANTIVERT) 25 mg tablet Take 1 Tab by mouth three (3) times daily as needed for up to 10 days. 30 Tab 0    atorvastatin (LIPITOR) 20 mg tablet Take 1 Tab by mouth daily. 30 Tab 5    hydrOXYzine pamoate (VISTARIL) 50 mg capsule Take 1 Cap by mouth four (4) times daily as needed. 60 Cap 1         PHYSICAL EXAM  Visit Vitals    /85 (BP 1 Location: Right arm, BP Patient Position: Standing)    Pulse 64    Resp 18    Ht 5' 5\" (1.651 m)    Wt 136 lb (61.7 kg)    LMP 02/15/2011    SpO2 99%    BMI 22.63 kg/m2       General: Well-developed and well-nourished, no distress. HEENT:  Head normocephalic/atraumatic, no scleral icterus  Lungs:  Clear to ausculation bilaterally. Good air movement. Heart:  Regular rate and rhythm, normal S1 and S2, no murmur, gallop, or rub  Extremities: No clubbing, cyanosis, or edema. Neurological: Alert and oriented. Positive Dina-Hallpike bilaterally though not as severe as at last visit. Psychiatric: Normal mood and affect.  Behavior is normal.     Results for orders placed or performed in visit on 11/17/17   HEPATITIS C AB   Result Value Ref Range    Hep C Virus Ab <0.1 0.0 - 0.9 s/co ratio   VITAMIN D, 25 HYDROXY   Result Value Ref Range    VITAMIN D, 25-HYDROXY 36.6 30.0 - 100.0 ng/mL   LIPID PANEL   Result Value Ref Range    Cholesterol, total 251 (H) 100 - 199 mg/dL    Triglyceride 53 0 - 149 mg/dL    HDL Cholesterol 105 >39 mg/dL    VLDL, calculated 11 5 - 40 mg/dL    LDL, calculated 135 (H) 0 - 99 mg/dL   CVD REPORT   Result Value Ref Range    INTERPRETATION Note          ASSESSMENT AND PLAN    ICD-10-CM ICD-9-CM    1. Dizziness R42 780.4 REFERRAL TO ENT-OTOLARYNGOLOGY   2. Screening for colon cancer Z12.11 V76.51 OCCULT BLOOD, IMMUNOASSAY (FIT)   3. Screening for breast cancer Z12.31 V76.10        Diagnoses and all orders for this visit:    1. Dizziness  -     REFERRAL TO ENT-OTOLARYNGOLOGY    2. Screening for colon cancer  -     OCCULT BLOOD, IMMUNOASSAY (FIT); Future    3. Screening for breast cancer  Has appointment today. Follow-up Disposition:  Return in about 4 months (around 4/6/2018), or if symptoms worsen or fail to improve, for Dizziness. Provided patient and/or family with advanced directive information and answered pertinent questions. Encouraged patient to provide a copy of advanced directive to the office when available. I have discussed the diagnosis with the patient and the intended plan as seen in the above orders. Patient is in agreement. The patient has received an after-visit summary and questions were answered concerning future plans. I have discussed medication side effects and warnings with the patient as well.

## 2017-12-06 NOTE — MR AVS SNAPSHOT
Visit Information Date & Time Provider Department Dept. Phone Encounter #  
 12/6/2017  9:50 AM Zakia Alonzo, [address] [phone] [id number] Follow-up Instructions Return in about 4 months (around 4/6/2018), or if symptoms worsen or fail to improve, for Dizziness. Upcoming Health Maintenance Date Due FOBT Q 1 YEAR AGE 50-75 6/13/2008 BREAST CANCER SCRN MAMMOGRAM 2/11/2014 PAP AKA CERVICAL CYTOLOGY 6/20/2020 DTaP/Tdap/Td series (2 - Td) 2/1/2022 Allergies as of 12/6/2017  Review Complete On: 12/6/2017 By: Zakia Alonzo MD  
 No Known Allergies Current Immunizations  Reviewed on 12/6/2017 Name Date HEP A/HEP B Combined Vaccine 3/7/2012, 2/1/2012 TD Vaccine 2/1/1998 TDAP Vaccine 2/1/2012 Reviewed by Caro Clayton LPN on 98/6/5223 at 72:49 AM  
You Were Diagnosed With   
  
 Codes Comments Dizziness    -  Primary ICD-10-CM: J13 ICD-9-CM: 780.4 Screening for colon cancer     ICD-10-CM: Z12.11 ICD-9-CM: V76.51 Screening for breast cancer     ICD-10-CM: Z12.31 
ICD-9-CM: V76.10 Vitals BP Pulse Resp Height(growth percentile) Weight(growth percentile) LMP  
 117/85 (BP 1 Location: Right arm, BP Patient Position: Standing) 64 18 5' 5\" (1.651 m) 136 lb (61.7 kg) 02/15/2011 SpO2 BMI OB Status Smoking Status 99% 22.63 kg/m2 Postmenopausal Current Every Day Smoker Vitals History BMI and BSA Data Body Mass Index Body Surface Area  
 22.63 kg/m 2 1.68 m 2 Preferred Pharmacy Pharmacy Name Phone [id number] [address] [address] [phone] Your Updated Medication List  
  
   
This list is accurate as of: 12/6/17 10:52 AM.  Always use your most recent med list.  
  
  
  
  
 atorvastatin 20 mg tablet Commonly known as:  LIPITOR Take 1 Tab by mouth daily. hydrOXYzine pamoate 50 mg capsule Commonly known as:  VISTARIL Take 1 Cap by mouth four (4) times daily as needed. meclizine 25 mg tablet Commonly known as:  ANTIVERT Take 1 Tab by mouth three (3) times daily as needed for up to 10 days. We Performed the Following REFERRAL TO ENT-OTOLARYNGOLOGY [BKR12 Custom] Comments:  
 Please evaluate and manage patient for dizziness. Follow-up Instructions Return in about 4 months (around 4/6/2018), or if symptoms worsen or fail to improve, for Dizziness. To-Do List   
 12/06/2017 3:00 PM  
  Appointment with [facility] HANSA 3 at [address] [place] [address] ([phone]) Shower or bathe using soap and water. Do not use deodorant, powder, perfumes, or lotion the day of your exam.  If your prior mammograms were not performed at [facility] 6 please bring films with you or forward prior images 2 days before your procedure. Check in at registration 15min before your appointment time unless you were instructed to do otherwise. A script is not necessary, but if you have one, please bring it on the day of the mammogram or have it faxed to the department. [facility] [phone] [facility]  [phone] [address]  [phone] [facility] [phone] [facility] [phone] [facility] [id number]  
  
 01/05/2018 Lab:  OCCULT BLOOD, IMMUNOASSAY (FIT) Referral Information Referral ID Referred By Referred To  
  
 [id number] [address] Fax: [phone] Visits Status Start Date End Date 1 New Request 12/6/17 12/6/18 If your referral has a status of pending review or denied, additional information will be sent to support the outcome of this decision. Patient Instructions Dizziness: Care Instructions Your Care Instructions Dizziness is the feeling of unsteadiness or fuzziness in your head.  It is different than having vertigo, which is a feeling that the room is spinning or that you are moving or falling. It is also different from lightheadedness, which is the feeling that you are about to faint. It can be hard to know what causes dizziness. Some people feel dizzy when they have migraine headaches. Sometimes bouts of flu can make you feel dizzy. Some medical conditions, such as heart problems or high blood pressure, can make you feel dizzy. Many medicines can cause dizziness, including medicines for high blood pressure, pain, or anxiety. If a medicine causes your symptoms, your doctor may recommend that you stop or change the medicine. If it is a problem with your heart, you may need medicine to help your heart work better. If there is no clear reason for your symptoms, your doctor may suggest watching and waiting for a while to see if the dizziness goes away on its own. Follow-up care is a key part of your treatment and safety. Be sure to make and go to all appointments, and call your doctor if you are having problems. It's also a good idea to know your test results and keep a list of the medicines you take. How can you care for yourself at home? · If your doctor recommends or prescribes medicine, take it exactly as directed. Call your doctor if you think you are having a problem with your medicine. · Do not drive while you feel dizzy. · Try to prevent falls. Steps you can take include: ¨ Using nonskid mats, adding grab bars near the tub, and using night-lights. ¨ Clearing your home so that walkways are free of anything you might trip on. ¨ Letting family and friends know that you have been feeling dizzy. This will help them know how to help you. When should you call for help? Call 911 anytime you think you may need emergency care. For example, call if: 
? · You passed out (lost consciousness). ? · You have dizziness along with symptoms of a heart attack. These may include: ¨ Chest pain or pressure, or a strange feeling in the chest. 
¨ Sweating. ¨ Shortness of breath. ¨ Nausea or vomiting. ¨ Pain, pressure, or a strange feeling in the back, neck, jaw, or upper belly or in one or both shoulders or arms. ¨ Lightheadedness or sudden weakness. ¨ A fast or irregular heartbeat. ? · You have symptoms of a stroke. These may include: 
¨ Sudden numbness, tingling, weakness, or loss of movement in your face, arm, or leg, especially on only one side of your body. ¨ Sudden vision changes. ¨ Sudden trouble speaking. ¨ Sudden confusion or trouble understanding simple statements. ¨ Sudden problems with walking or balance. ¨ A sudden, severe headache that is different from past headaches. ?Call your doctor now or seek immediate medical care if: 
? · You feel dizzy and have a fever, headache, or ringing in your ears. ? · You have new or increased nausea and vomiting. ? · Your dizziness does not go away or comes back. ? Watch closely for changes in your health, and be sure to contact your doctor if: 
? · You do not get better as expected. Where can you learn more? Go to http://danica-deejay.info/. Enter J809 in the search box to learn more about \"Dizziness: Care Instructions. \" Current as of: March 20, 2017 Content Version: 11.4 © 7336-6775 [employer]. Care instructions adapted under license by [employer] (which disclaims liability or warranty for this information). If you have questions about a medical condition or this instruction, always ask your healthcare professional. [place] [address] any warranty or liability for your use of this information. Introducing [facility]! Ammon [employer] introduces [employer] patient portal. Now you can access parts of your medical record, email your doctor's office, and request medication refills online.    
 
1. In your internet browser, go to [email or web address] 2. Click on the First Time User? Click Here link in the Sign In box. You will see the New Member Sign Up page. 3. Enter your [employer]activ Access Code exactly as it appears below. You will not need to use this code after youve completed the sign-up process. If you do not sign up before the expiration date, you must request a new code. · [employer]activ Access Code: [email or web address] Expires: 2/15/2018  2:40 PM 
 
4. Enter the last four digits of your Social Security Number (xxxx) and Date of Birth (mm/dd/yyyy) as indicated and click Submit. You will be taken to the next sign-up page. 5. Create a [employer] ID. This will be your Transactiv login ID and cannot be changed, so think of one that is secure and easy to remember. 6. Create a [employer]activ password. You can change your password at any time. 7. Enter your Password Reset Question and Answer. This can be used at a later time if you forget your password. 8. Enter your e-mail address. You will receive e-mail notification when new information is available in [address] 9. Click Sign Up. You can now view and download portions of your medical record. 10. Click the Download Summary menu link to download a portable copy of your medical information. If you have questions, please visit the Frequently Asked Questions section of the [employer]activ website. Remember, Transactiv is NOT to be used for urgent needs. For medical emergencies, dial 911. Now available from your iPhone and Android! Please provide this summary of care documentation to your next provider. Your primary care clinician is listed as [employer]. If you have any questions after today's visit, please call [phone].

## 2019-03-25 ENCOUNTER — TELEPHONE (OUTPATIENT)
Dept: INTERNAL MEDICINE CLINIC | Facility: CLINIC | Age: 59
End: 2019-03-25

## 2019-03-25 NOTE — TELEPHONE ENCOUNTER
Pt would like a call back to let her know if she had a TB test or some kind of skin test done while she was here. Pt can be reached at [phone].

## 2019-10-17 ENCOUNTER — OFFICE VISIT (OUTPATIENT)
Dept: INTERNAL MEDICINE CLINIC | Facility: CLINIC | Age: 59
End: 2019-10-17

## 2019-10-17 VITALS
RESPIRATION RATE: 16 BRPM | DIASTOLIC BLOOD PRESSURE: 74 MMHG | OXYGEN SATURATION: 97 % | TEMPERATURE: 99.1 F | HEART RATE: 78 BPM | BODY MASS INDEX: 26.99 KG/M2 | WEIGHT: 162 LBS | HEIGHT: 65 IN | SYSTOLIC BLOOD PRESSURE: 117 MMHG

## 2019-10-17 DIAGNOSIS — Z12.39 SCREENING FOR BREAST CANCER: ICD-10-CM

## 2019-10-17 DIAGNOSIS — E55.9 VITAMIN D DEFICIENCY: ICD-10-CM

## 2019-10-17 DIAGNOSIS — F10.21 ALCOHOL DEPENDENCE IN REMISSION (HCC): ICD-10-CM

## 2019-10-17 DIAGNOSIS — Z00.00 ROUTINE GENERAL MEDICAL EXAMINATION AT A HEALTH CARE FACILITY: Primary | ICD-10-CM

## 2019-10-17 DIAGNOSIS — F41.1 GENERALIZED ANXIETY DISORDER: ICD-10-CM

## 2019-10-17 DIAGNOSIS — Z12.11 SCREENING FOR COLON CANCER: ICD-10-CM

## 2019-10-17 DIAGNOSIS — E66.3 OVERWEIGHT (BMI 25.0-29.9): ICD-10-CM

## 2019-10-17 DIAGNOSIS — F31.9 BIPOLAR DISORDER WITH DEPRESSION (HCC): ICD-10-CM

## 2019-10-17 DIAGNOSIS — E78.00 HYPERCHOLESTEROLEMIA: ICD-10-CM

## 2019-10-17 RX ORDER — VENLAFAXINE HYDROCHLORIDE 150 MG/1
300 CAPSULE, EXTENDED RELEASE ORAL DAILY
COMMUNITY
Start: 2019-10-13

## 2019-10-17 RX ORDER — LAMOTRIGINE 200 MG/1
400 TABLET ORAL 2 TIMES DAILY
COMMUNITY

## 2019-10-17 NOTE — PATIENT INSTRUCTIONS
Well Visit, Women 48 to 72: Care Instructions  Your Care Instructions    Physical exams can help you stay healthy. Your doctor has checked your overall health and may have suggested ways to take good care of yourself. He or she also may have recommended tests. At home, you can help prevent illness with healthy eating, regular exercise, and other steps. Follow-up care is a key part of your treatment and safety. Be sure to make and go to all appointments, and call your doctor if you are having problems. It's also a good idea to know your test results and keep a list of the medicines you take. How can you care for yourself at home? · Reach and stay at a healthy weight. This will lower your risk for many problems, such as obesity, diabetes, heart disease, and high blood pressure. · Get at least 30 minutes of exercise on most days of the week. Walking is a good choice. You also may want to do other activities, such as running, swimming, cycling, or playing tennis or team sports. · Do not smoke. Smoking can make health problems worse. If you need help quitting, talk to your doctor about stop-smoking programs and medicines. These can increase your chances of quitting for good. · Protect your skin from too much sun. When you're outdoors from 10 a.m. to 4 p.m., stay in the shade or cover up with clothing and a hat with a wide brim. Wear sunglasses that block UV rays. Even when it's cloudy, put broad-spectrum sunscreen (SPF 30 or higher) on any exposed skin. · See a dentist one or two times a year for checkups and to have your teeth cleaned. · Wear a seat belt in the car. Follow your doctor's advice about when to have certain tests. These tests can spot problems early. · Cholesterol. Your doctor will tell you how often to have this done based on your age, family history, or other things that can increase your risk for heart attack and stroke. · Blood pressure.  Have your blood pressure checked during a routine doctor visit. Your doctor will tell you how often to check your blood pressure based on your age, your blood pressure results, and other factors. · Mammogram. Ask your doctor how often you should have a mammogram, which is an X-ray of your breasts. A mammogram can spot breast cancer before it can be felt and when it is easiest to treat. · Pap test and pelvic exam. Ask your doctor how often you should have a Pap test. You may not need to have a Pap test as often as you used to. · Vision. Have your eyes checked every year or two or as often as your doctor suggests. Some experts recommend that you have yearly exams for glaucoma and other age-related eye problems starting at age 48. · Hearing. Tell your doctor if you notice any change in your hearing. You can have tests to find out how well you hear. · Diabetes. Ask your doctor whether you should have tests for diabetes. · Colorectal cancer. Your risk for colorectal cancer gets higher as you get older. Some experts say that adults should start regular screening at age 48 and stop at age 76. Others say to start before age 48 or continue after age 76. Talk with your doctor about your risk and when to start and stop screening. · Thyroid disease. Talk to your doctor about whether to have your thyroid checked as part of a regular physical exam. Women have an increased chance of a thyroid problem. · Osteoporosis. You should begin tests for bone density at age 72. If you are younger than 72, ask your doctor whether you have factors that may increase your risk for this disease. You may want to have this test before age 72. · Heart attack and stroke risk. At least every 4 to 6 years, you should have your risk for heart attack and stroke assessed. Your doctor uses factors such as your age, blood pressure, cholesterol, and whether you smoke or have diabetes to show what your risk for a heart attack or stroke is over the next 10 years.   When should you call for help?  Watch closely for changes in your health, and be sure to contact your doctor if you have any problems or symptoms that concern you. Where can you learn more? Go to http://danica-deejay.info/. Enter P148 in the search box to learn more about \"Well Visit, Women 50 to 72: Care Instructions. \"  Current as of: December 13, 2018  Content Version: 12.2  © 7235-8470 [employer]. Care instructions adapted under license by [employer] (which disclaims liability or warranty for this information). If you have questions about a medical condition or this instruction, always ask your healthcare professional. [address] any warranty or liability for your use of this information.

## 2019-10-17 NOTE — PROGRESS NOTES
Macario Kellogg  Identified pt with two pt identifiers(name and ). Chief Complaint   Patient presents with    Other     reestablish care       1. Have you been to the ER, urgent care clinic since your last visit? Hospitalized since your last visit? [address]    2. Have you seen or consulted any other health care providers outside of the [address] [place] [address] since your last visit? Include any pap smears or colon screening. Jordin Arias/pepper    Today's provider has been notified of reason for visit, vitals and flowsheets obtained on patients. Advanced directives on file. Reviewed record In preparation for visit, huddled with provider and have obtained necessary documentation      Health Maintenance Due   Topic    Pneumococcal 0-64 years (1 of 1 - PPSV23)    FOBT Q 1 YEAR AGE 54-65     Shingrix Vaccine Age 49> (2 of 2)    Influenza Age 5 to Adult     BREAST CANCER SCRN MAMMOGRAM        Wt Readings from Last 3 Encounters:   10/17/19 162 lb (73.5 kg)   17 136 lb (61.7 kg)   17 136 lb (61.7 kg)     Temp Readings from Last 3 Encounters:   10/17/19 99.1 °F (37.3 °C) (Oral)   17 97.6 °F (36.4 °C) (Oral)   17 98.8 °F (37.1 °C) (Oral)     BP Readings from Last 3 Encounters:   10/17/19 117/74   17 117/85   17 114/75     Pulse Readings from Last 3 Encounters:   10/17/19 78   17 64   17 67     Vitals:    10/17/19 0932   BP: 117/74   Pulse: 78   Resp: 16   Temp: 99.1 °F (37.3 °C)   TempSrc: Oral   SpO2: 97%   Weight: 162 lb (73.5 kg)   Height: 5' 5\" (1.651 m)   PainSc:   0 - No pain   LMP: 02/15/2011         Learning Assessment:  :     Learning Assessment 2017   PRIMARY LEARNER Patient   HIGHEST LEVEL OF EDUCATION - PRIMARY LEARNER  2 YEARS OF COLLEGE   BARRIERS PRIMARY LEARNER NONE   CO-LEARNER CAREGIVER No   PRIMARY LANGUAGE ENGLISH   LEARNER PREFERENCE PRIMARY LISTENING   ANSWERED BY self   RELATIONSHIP SELF       Depression Screening:  :     3 most recent PHQ Screens 12/6/2017   Little interest or pleasure in doing things Not at all   Feeling down, depressed, irritable, or hopeless Not at all   Total Score PHQ 2 0       Fall Risk Assessment:  :     No flowsheet data found. Abuse Screening:  :     No flowsheet data found. ADL Screening:  :     ADL Assessment 5/31/2017   Feeding yourself No Help Needed   Getting from bed to chair No Help Needed   Getting dressed No Help Needed   Bathing or showering No Help Needed   Walk across the room (includes cane/walker) No Help Needed   Using the telphone No Help Needed   Taking your medications No Help Needed   Preparing meals No Help Needed   Managing money (expenses/bills) No Help Needed   Moderately strenuous housework (laundry) No Help Needed   Shopping for personal items (toiletries/medicines) No Help Needed   Shopping for groceries No Help Needed   Driving No Help Needed   Climbing a flight of stairs No Help Needed   Getting to places beyond walking distances No Help Needed                 Medication reconciliation up to date and corrected with patient at this time.

## 2019-10-17 NOTE — PROGRESS NOTES
CC:  Chief Complaint   Patient presents with    Other     reestablish care       HISTORY OF PRESENT ILLNESS  Macario Kellogg is a 62 y.o. female. Presents to [facility]. Last seen in clinic on 12/6/17. Was unable to come to clinic since then due to her health insurance not being accepted. Now has CIRILO. LUCILA Solis that is accepted at this clinic. She has hypercholesterolemia, vitamin D deficiency, bipolar disorder with depression, anxiety disorder, alcohol dependence in remission, and BPPV. Today she has no complaints. Reports having anxiety and occasional panic attacks. Has occasional muscle pain at neck. Denies depressed mood, poor sleep, dizziness, CP, SOB, abdominal pain, heartburn, nausea, vomiting, diarrhea, or constipation. Other Provider: Rochelle Galeas NP, [facility]      Soc Hx  . Has 3 children and 1 grandchild. Sold her house and now lives in a house that she rents. 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 Smokes 5 cigarettes a day. History of alcohol abuse; no alcohol since spring 2017. Attends AA meetings regularly; leads [place] [id number] meetings at [facility]. No regular exercise; used to do yoga. Soc Hx  . Has 3 children and 1 grandchild. Lives alone in a house she rents. Unemployed 3 times over past 11 months. She has her own [employer]. History of alcohol abuse; sober for 8 months. Goes to [employer] [place] once daily. Does yoga for exercise. Walks dog 1. 5 hours a day.     Health Maintenance  Flu vaccine: declined  Tetanus vaccine: Tdap 2/1/12                          Pap smear: 6/20/17, negative, next due 6/2020   Mammogram: 12/16/17, due for this  Colonoscopy: due for this  Lipids: will check today  A1c: will check today  Advanced Directives: given information  End of Life: given information                 ROS  A complete review of systems was performed and is negative except for those mentioned in the HPI.       Patient Active Problem List   Diagnosis Code    Pure hypercholesterolemia E78.00    Elevated blood pressure reading without diagnosis of hypertension R03.0    Depression F32.9    Anxiety state, unspecified F41.1    Insomnia G47.00    Vitamin D deficiency E55.9    Closed displaced fracture of proximal phalanx of right middle finger S62.614K     Past Medical History:   Diagnosis Date    ASCUS on Pap smear     Chickenpox childhood    Clavicle fracture 1968    Closed fracture of tibial plateau     Right.  ([address])    Depression with anxiety     Dr. Gracia Rear Fibrocystic breast     bilateral    High cholesterol 2007     Past Surgical History:   Procedure Laterality Date    HX DILATION AND CURETTAGE  1985    due to afterbirth-vag. bleeding     HX ORTHOPAEDIC Right 2017    Dr. White Apo; 3rd finger surgery Nehal Howard splint placement with percutaneous pin fixation of intercondylar proximal phalanx fracture)    HX TONSIL AND ADENOIDECTOMY      HX WISDOM TEETH EXTRACTION      TIBIAL SCOPE/SURG/FX AID,BICONDYLAR  2010     Social History     Socioeconomic History    Marital status:      Spouse name: Not on file    Number of children: Not on file    Years of education: Not on file    Highest education level: Not on file   Tobacco Use    Smoking status: Current Every Day Smoker     Packs/day: 0.25     Years: 5.00     Pack years: 1.25     Types: Cigarettes     Last attempt to quit: 2013     Years since quittin.0    Smokeless tobacco: Never Used    Tobacco comment: smokes 20/week   Substance and Sexual Activity    Alcohol use: No     Alcohol/week: 1.7 standard drinks     Types: 2 Glasses of wine per week    Drug use: No    Sexual activity: Yes     Partners: Female     Birth control/protection: None     Family History   Problem Relation Age of Onset    Anxiety Mother     Arthritis-osteo Mother     Cancer Mother     Heart Disease Maternal Grandfather         CHF    Stroke Maternal Grandfather     Hypertension Maternal Grandfather     Cancer Father         prostate    Dementia Maternal Grandmother     Depression Paternal Aunt     Depression Paternal Uncle      No Known Allergies  Current Outpatient Medications   Medication Sig Dispense Refill    lamoTRIgine (LAMICTAL) 200 mg tablet Take 400 mg by mouth two (2) times a day.  venlafaxine-SR (EFFEXOR-XR) 150 mg capsule Take 300 mg by mouth daily. PHYSICAL EXAM  Visit Vitals  /74 (BP 1 Location: Left arm, BP Patient Position: Sitting)   Pulse 78   Temp 99.1 °F (37.3 °C) (Oral)   Resp 16   Ht 5' 5\" (1.651 m)   Wt 162 lb (73.5 kg)   LMP 02/15/2011 (LMP Unknown)   SpO2 97%   BMI 26.96 kg/m²   26 lb weight increase since last clinic visit in 12/17    General: Well-developed, well-nourished. In no distress. Alert and oriented to person, place, and time. Head: Normocephalic, atraumatic. Eyes: Conjunctiva clear. Pupils equal, round, reactive to light. Extraocular movements intact. Ears/Nose: Normal nasal mucosa. Mouth/Throat: Oropharynx benign. Neck: Supple, no lymphadenopathy, no carotid bruits, no JVD, thyroid: not enlarged, symmetric, no tenderness/mass/nodules. Lungs: Clear to auscultation bilaterally. No crackles or wheezes. Chest Wall: No tenderness or deformity. Heart: RRR, normal S1 and S2, no murmur, click, rub, or gallop. Back: ROM normal. No CVA tenderness. Abdomen: Soft, non-distended, bowel sounds normal. No tenderness. No masses. No hepatosplenomegaly. Lymph nodes: Cervical and supraclavicular nodes normal.  Extremities: No clubbing, cyanosis, or edema. Skin: No rashes or lesions. Pulses: 2+ and symmetric at dorsalis pedis and posterior tibialis pulses. Neurological: CN II-XII intact. Non-focal exam.  Musculoskeletal: Gait normal. ROM normal at both knees. Psychiatric: Normal mood and affect. Behavior is normal.            ASSESSMENT AND PLAN    ICD-10-CM ICD-9-CM    1.  Routine general medical examination at a health care facility F48.41 G50.1 METABOLIC PANEL, COMPREHENSIVE      CBC WITH AUTOMATED DIFF      HEMOGLOBIN A1C WITH EAG   2. Hypercholesterolemia E78.00 272.0 LIPID PANEL      TSH RFX ON ABNORMAL TO FREE T4   3. Vitamin D deficiency E55.9 268.9 VITAMIN D, 25 HYDROXY   4. Bipolar disorder with depression ([facility] F31.9 296.50    5. Generalized anxiety disorder F41.1 300.02    6. Alcohol dependence in remission ([facility] F10.21 303.93    7. Screening for colon cancer Z12.11 V76.51 OCCULT BLOOD IMMUNOASSAY,DIAGNOSTIC   8. Screening for breast cancer Z12.39 V76.10 HANSA MAMMO BI SCREENING INCL CAD   9. Overweight (BMI 25.0-29. 9) E66.3 278.02      Diagnoses and all orders for this visit:    1. Routine general medical examination at a health care facility  -     METABOLIC PANEL, COMPREHENSIVE  -     CBC WITH AUTOMATED DIFF  -     HEMOGLOBIN A1C WITH EAG    2. Hypercholesterolemia  Has been out of atorvastatin 20 mg daily for over a year. Will check lipid panel to see if she needs to restart it. -     LIPID PANEL  -     TSH RFX ON ABNORMAL TO FREE T4    3. Vitamin D deficiency  -     VITAMIN D, 25 HYDROXY    4. Bipolar disorder with depression ([facility]  Controlled on [place]. Follow with Cathleen Seay NP.    5. Generalized anxiety disorder  Still some anxiety on sertraline. Follow with Cathleen Seay NP. 6. Alcohol dependence in remission [facility])  Encouraged to continue attending [facility] meetings. 7. Screening for colon cancer  -     OCCULT BLOOD IMMUNOASSAY,DIAGNOSTIC; Future    8. Screening for breast cancer  -     HANSA MAMMO BI SCREENING INCL CAD; Future    9. Overweight (BMI 25.0-29. 9)  Discussed the patient's BMI with her. The BMI follow up plan is as follows: dietary management education, guidance, and counseling; encourage exercise; monitor weight; prescribed dietary intake. Follow up BMI in 6 months. Greater than 40 mins direct face-to-face time spent with patient.  Greater than 50% of time spent on counseling and coordination of care. Follow-up and Dispositions    · Return in about 6 months (around 4/17/2020), or if symptoms worsen or fail to improve, for Cholesterol, vit D. I have discussed the diagnosis with the patient and the intended plan as seen in the above orders. Patient is in agreement. The patient has received an after-visit summary and questions were answered concerning future plans. I have discussed medication side effects and warnings with the patient as well.

## 2019-10-18 LAB
25(OH)D3+25(OH)D2 SERPL-MCNC: 25.1 NG/ML (ref 30–100)
ALBUMIN SERPL-MCNC: 4.5 G/DL (ref 3.6–4.8)
ALBUMIN/GLOB SERPL: 1.7 {RATIO} (ref 1.2–2.2)
ALP SERPL-CCNC: 70 IU/L (ref 39–117)
ALT SERPL-CCNC: 29 IU/L (ref 0–32)
AST SERPL-CCNC: 17 IU/L (ref 0–40)
BASOPHILS # BLD AUTO: 0.1 X10E3/UL (ref 0–0.2)
BASOPHILS NFR BLD AUTO: 1 %
BILIRUB SERPL-MCNC: <0.2 MG/DL (ref 0–1.2)
BUN SERPL-MCNC: 14 MG/DL (ref 8–27)
BUN/CREAT SERPL: 16 (ref 12–28)
CALCIUM SERPL-MCNC: 9.9 MG/DL (ref 8.7–10.3)
CHLORIDE SERPL-SCNC: 99 MMOL/L (ref 96–106)
CHOLEST SERPL-MCNC: 224 MG/DL (ref 100–199)
CO2 SERPL-SCNC: 24 MMOL/L (ref 20–29)
CREAT SERPL-MCNC: 0.88 MG/DL (ref 0.57–1)
EOSINOPHIL # BLD AUTO: 0.2 X10E3/UL (ref 0–0.4)
EOSINOPHIL NFR BLD AUTO: 2 %
ERYTHROCYTE [DISTWIDTH] IN BLOOD BY AUTOMATED COUNT: 13.3 % (ref 12.3–15.4)
EST. AVERAGE GLUCOSE BLD GHB EST-MCNC: 111 MG/DL
GLOBULIN SER CALC-MCNC: 2.7 G/DL (ref 1.5–4.5)
GLUCOSE SERPL-MCNC: 100 MG/DL (ref 65–99)
HBA1C MFR BLD: 5.5 % (ref 4.8–5.6)
HCT VFR BLD AUTO: 39 % (ref 34–46.6)
HDLC SERPL-MCNC: 81 MG/DL
HGB BLD-MCNC: 13.6 G/DL (ref 11.1–15.9)
IMM GRANULOCYTES # BLD AUTO: 0 X10E3/UL (ref 0–0.1)
IMM GRANULOCYTES NFR BLD AUTO: 0 %
LDLC SERPL CALC-MCNC: 123 MG/DL (ref 0–99)
LYMPHOCYTES # BLD AUTO: 1.9 X10E3/UL (ref 0.7–3.1)
LYMPHOCYTES NFR BLD AUTO: 24 %
MCH RBC QN AUTO: 30.2 PG (ref 26.6–33)
MCHC RBC AUTO-ENTMCNC: 34.9 G/DL (ref 31.5–35.7)
MCV RBC AUTO: 87 FL (ref 79–97)
MONOCYTES # BLD AUTO: 0.4 X10E3/UL (ref 0.1–0.9)
MONOCYTES NFR BLD AUTO: 5 %
NEUTROPHILS # BLD AUTO: 5.7 X10E3/UL (ref 1.4–7)
NEUTROPHILS NFR BLD AUTO: 68 %
PLATELET # BLD AUTO: 355 X10E3/UL (ref 150–450)
POTASSIUM SERPL-SCNC: 4.8 MMOL/L (ref 3.5–5.2)
PROT SERPL-MCNC: 7.2 G/DL (ref 6–8.5)
RBC # BLD AUTO: 4.5 X10E6/UL (ref 3.77–5.28)
SODIUM SERPL-SCNC: 139 MMOL/L (ref 134–144)
TRIGL SERPL-MCNC: 100 MG/DL (ref 0–149)
TSH SERPL DL<=0.005 MIU/L-ACNC: 3.77 UIU/ML (ref 0.45–4.5)
VLDLC SERPL CALC-MCNC: 20 MG/DL (ref 5–40)
WBC # BLD AUTO: 8.2 X10E3/UL (ref 3.4–10.8)

## 2019-10-23 DIAGNOSIS — E78.00 HYPERCHOLESTEROLEMIA: Primary | ICD-10-CM

## 2019-10-23 DIAGNOSIS — E55.9 VITAMIN D DEFICIENCY: ICD-10-CM

## 2019-10-23 PROBLEM — S62.612A CLOSED DISPLACED FRACTURE OF PROXIMAL PHALANX OF RIGHT MIDDLE FINGER: Status: RESOLVED | Noted: 2017-09-20 | Resolved: 2019-10-23

## 2019-10-23 PROBLEM — F10.21 ALCOHOL DEPENDENCE IN REMISSION (HCC): Status: ACTIVE | Noted: 2019-10-23

## 2019-10-23 RX ORDER — ERGOCALCIFEROL 1.25 MG/1
50000 CAPSULE ORAL
Qty: 12 CAP | Refills: 2 | Status: SHIPPED | OUTPATIENT
Start: 2019-10-23

## 2019-10-23 RX ORDER — ATORVASTATIN CALCIUM 20 MG/1
20 TABLET, FILM COATED ORAL DAILY
Qty: 90 TAB | Refills: 3 | Status: SHIPPED | OUTPATIENT
Start: 2019-10-23

## 2019-10-23 NOTE — PROGRESS NOTES
Your labs were all normal except your total cholesterol was high at 224 and your vitamin D level was low. Dr. Mihir Montes is sending prescriptions to your pharmacy for atorvastatin, the same cholesterol medication you took before, and once weekly vitamin D capsules.

## 2019-12-10 ENCOUNTER — HOSPITAL ENCOUNTER (OUTPATIENT)
Dept: MAMMOGRAPHY | Age: 59
Discharge: HOME | End: 2019-12-10
Attending: INTERNAL MEDICINE
Payer: MEDICAID

## 2019-12-10 DIAGNOSIS — Z12.31 VISIT FOR SCREENING MAMMOGRAM: ICD-10-CM

## 2019-12-10 PROCEDURE — 77067 SCR MAMMO BI INCL CAD: CPT

## 2020-01-08 ENCOUNTER — OFFICE VISIT (OUTPATIENT)
Dept: INTERNAL MEDICINE CLINIC | Facility: CLINIC | Age: 60
End: 2020-01-08

## 2020-01-08 VITALS
SYSTOLIC BLOOD PRESSURE: 131 MMHG | BODY MASS INDEX: 27.82 KG/M2 | HEIGHT: 65 IN | WEIGHT: 167 LBS | HEART RATE: 79 BPM | RESPIRATION RATE: 16 BRPM | DIASTOLIC BLOOD PRESSURE: 83 MMHG | TEMPERATURE: 98 F | OXYGEN SATURATION: 98 %

## 2020-01-08 DIAGNOSIS — Z12.11 SCREENING FOR COLON CANCER: ICD-10-CM

## 2020-01-08 DIAGNOSIS — L02.211 ABSCESS OF SKIN OF ABDOMEN: Primary | ICD-10-CM

## 2020-01-08 RX ORDER — CEPHALEXIN 500 MG/1
500 CAPSULE ORAL 2 TIMES DAILY
Qty: 14 CAP | Refills: 0 | Status: SHIPPED | OUTPATIENT
Start: 2020-01-08 | End: 2020-01-15

## 2020-01-08 NOTE — PROGRESS NOTES
Arben Jaeger  Identified pt with two pt identifiers(name and ). Chief Complaint   Patient presents with    Other     sore on lower abdomen       1. Have you been to the ER, urgent care clinic since your last visit? Hospitalized since your last visit? NO    2. Have you seen or consulted any other health care providers outside of the [address] [place] [address] since your last visit? Include any pap smears or colon screening. NO    Today's provider has been notified of reason for visit, vitals and flowsheets obtained on patients. Advanced directives on file.     Reviewed record In preparation for visit, huddled with provider and have obtained necessary documentation      Health Maintenance Due   Topic    FOBT Q 1 YEAR AGE 50-75     Shingrix Vaccine Age 50> (2 of 2)       Wt Readings from Last 3 Encounters:   20 167 lb (75.8 kg)   10/17/19 162 lb (73.5 kg)   17 136 lb (61.7 kg)     Temp Readings from Last 3 Encounters:   20 98 °F (36.7 °C) (Oral)   10/17/19 99.1 °F (37.3 °C) (Oral)   17 97.6 °F (36.4 °C) (Oral)     BP Readings from Last 3 Encounters:   20 144/84   10/17/19 117/74   17 117/85     Pulse Readings from Last 3 Encounters:   20 79   10/17/19 78   17 64     Vitals:    20 1110   BP: 144/84   Pulse: 79   Resp: 16   Temp: 98 °F (36.7 °C)   TempSrc: Oral   SpO2: 98%   Weight: 167 lb (75.8 kg)   Height: 5' 5\" (1.651 m)   PainSc:   0 - No pain   LMP: 02/15/2011         Learning Assessment:  :     Learning Assessment 2017   PRIMARY LEARNER Patient   HIGHEST LEVEL OF EDUCATION - PRIMARY LEARNER  2 YEARS OF COLLEGE   BARRIERS PRIMARY LEARNER NONE   CO-LEARNER CAREGIVER No   PRIMARY LANGUAGE ENGLISH   LEARNER PREFERENCE PRIMARY LISTENING   ANSWERED BY self   RELATIONSHIP SELF       Depression Screening:  :     3 most recent PHQ Screens 2020   PHQ Not Done (No Data)   Little interest or pleasure in doing things -   Feeling down, depressed, irritable, or hopeless -   Total Score PHQ 2 -       Fall Risk Assessment:  :     No flowsheet data found. Abuse Screening:  :     No flowsheet data found. ADL Screening:  :     ADL Assessment 5/31/2017   Feeding yourself No Help Needed   Getting from bed to chair No Help Needed   Getting dressed No Help Needed   Bathing or showering No Help Needed   Walk across the room (includes cane/walker) No Help Needed   Using the telphone No Help Needed   Taking your medications No Help Needed   Preparing meals No Help Needed   Managing money (expenses/bills) No Help Needed   Moderately strenuous housework (laundry) No Help Needed   Shopping for personal items (toiletries/medicines) No Help Needed   Shopping for groceries No Help Needed   Driving No Help Needed   Climbing a flight of stairs No Help Needed   Getting to places beyond walking distances No Help Needed                 Medication reconciliation up to date and corrected with patient at this time.

## 2020-01-08 NOTE — PATIENT INSTRUCTIONS
Skin Abscess: Care Instructions  Your Care Instructions    A skin abscess is a bacterial infection that forms a pocket of pus. A boil is a kind of skin abscess. The doctor may have cut an opening in the abscess so that the pus can drain out. You may have gauze in the cut so that the abscess will stay open and keep draining. You may need antibiotics. You will need to follow up with your doctor to make sure the infection has gone away. The doctor has checked you carefully, but problems can develop later. If you notice any problems or new symptoms, get medical treatment right away. Follow-up care is a key part of your treatment and safety. Be sure to make and go to all appointments, and call your doctor if you are having problems. It's also a good idea to know your test results and keep a list of the medicines you take. How can you care for yourself at home? · Apply warm and dry compresses, a heating pad set on low, or a hot water bottle 3 or 4 times a day for pain. Keep a cloth between the heat source and your skin. · If your doctor prescribed antibiotics, take them as directed. Do not stop taking them just because you feel better. You need to take the full course of antibiotics. · Take pain medicines exactly as directed. ? If the doctor gave you a prescription medicine for pain, take it as prescribed. ? If you are not taking a prescription pain medicine, ask your doctor if you can take an over-the-counter medicine. · Keep your bandage clean and dry. Change the bandage whenever it gets wet or dirty, or at least one time a day. · If the abscess was packed with gauze:  ? Keep follow-up appointments to have the gauze changed or removed. If the doctor instructed you to remove the gauze, follow the instructions you were given for how to remove it. ? After the gauze is removed, soak the area in warm water for 15 to 20 minutes 2 times a day, until the wound closes. When should you call for help?   Call your doctor now or seek immediate medical care if:    · You have signs of worsening infection, such as:  ? Increased pain, swelling, warmth, or redness. ? Red streaks leading from the infected skin. ? Pus draining from the wound. ? A fever.    Watch closely for changes in your health, and be sure to contact your doctor if:    · You do not get better as expected. Where can you learn more? Go to http://danica-deejay.info/. Enter M474 in the search box to learn more about \"Skin Abscess: Care Instructions. \"  Current as of: April 1, 2019  Content Version: 12.2  © 8101-2496 [employer]. Care instructions adapted under license by [employer] (which disclaims liability or warranty for this information). If you have questions about a medical condition or this instruction, always ask your healthcare professional. [address] any warranty or liability for your use of this information.

## 2020-01-08 NOTE — PROGRESS NOTES
CC:   Chief Complaint   Patient presents with    Other     sore on lower abdomen       HISTORY OF PRESENT ILLNESS  Radha Martinez is a 62 y.o. female. Patient complains of 2 day history of a bump on her lower abdomen. Mildly tender, not enlarging, no discharge. Had an abscess at the same location about 2 years ago that gradually enlarged and was treated with incision and drainage with packing. Concerned that this bump may get larger like the previous one did. Patient Active Problem List   Diagnosis Code    Hypercholesterolemia E78.00    Elevated blood pressure reading without diagnosis of hypertension R03.0    Bipolar disorder with depression ([facility] F31.9    Generalized anxiety disorder F41.1    Insomnia G47.00    Vitamin D deficiency E55.9    Alcohol dependence in remission ([facility] F10.21     Past Medical History:   Diagnosis Date    ASCUS on Pap smear 1998    Chickenpox childhood    Clavicle fracture 1968    Closed displaced fracture of proximal phalanx of right middle finger 9/20/2017    Dr. Brandon Mercado. Had fracture in 7/17; to have osteotomy with open reduction internal fixation and extensor tenolysis    Closed fracture of tibial plateau 69/4675    Right.  [facility])    Depression with anxiety 2011    Dr. Azucena Gallagher Fibrocystic breast     bilateral    High cholesterol 04/2007     No Known Allergies    Current Outpatient Medications   Medication Sig Dispense Refill    atorvastatin (LIPITOR) 20 mg tablet Take 1 Tab by mouth daily. Indications: high cholesterol 90 Tab 3    ergocalciferol (ERGOCALCIFEROL) 50,000 unit capsule Take 1 Cap by mouth every seven (7) days. Indications: low vitamin D levels 12 Cap 2    lamoTRIgine (LAMICTAL) 200 mg tablet Take 400 mg by mouth two (2) times a day.  venlafaxine-SR (EFFEXOR-XR) 150 mg capsule Take 300 mg by mouth daily.            PHYSICAL EXAM  Visit Vitals  /83   Pulse 79   Temp 98 °F (36.7 °C) (Oral)   Resp 16   Ht 5' 5\" (1.651 m)   Wt 167 lb (75.8 kg)   LMP 02/15/2011 (LMP Unknown)   SpO2 98%   BMI 27.79 kg/m²       General: Well-developed and well-nourished, no distress. HEENT:  Head normocephalic/atraumatic, no scleral icterus  Skin: At mid lower abdomen, 1.5 cm mildly tender, soft, mobile subcutaneous nodule with surrounding erythema and no opening or drainage. ASSESSMENT AND PLAN    ICD-10-CM ICD-9-CM    1. Abscess of skin of abdomen L02.211 682.2 cephALEXin (KEFLEX) 500 mg capsule      REFERRAL TO GENERAL SURGERY   2. Screening for colon cancer Z12.11 V76.51 OCCULT BLOOD IMMUNOASSAY,DIAGNOSTIC     Diagnoses and all orders for this visit:    1. Abscess of skin of abdomen  Needs complete resection of cyst capsule to prevent future abscess at same time.  -     Start cephALEXin (KEFLEX) 500 mg capsule; Take 1 Cap by mouth two (2) times a day for 7 days.  -     REFERRAL TO GENERAL SURGERY    2. Screening for colon cancer  -     OCCULT BLOOD IMMUNOASSAY,DIAGNOSTIC (already has at home)      Follow-up and Dispositions    · Return in about 3 months (around 4/17/2020), or if symptoms worsen or fail to improve, for 6 mon FU for Cholesterol, vit D. I have discussed the diagnosis with the patient and the intended plan as seen in the above orders. Patient is in agreement. The patient has received an after-visit summary and questions were answered concerning future plans. I have discussed medication side effects and warnings with the patient as well.

## 2020-01-21 ENCOUNTER — OFFICE VISIT (OUTPATIENT)
Dept: SURGERY | Age: 60
End: 2020-01-21

## 2020-01-21 VITALS
SYSTOLIC BLOOD PRESSURE: 136 MMHG | BODY MASS INDEX: 27.66 KG/M2 | WEIGHT: 166 LBS | OXYGEN SATURATION: 98 % | TEMPERATURE: 98.3 F | HEIGHT: 65 IN | RESPIRATION RATE: 16 BRPM | HEART RATE: 86 BPM | DIASTOLIC BLOOD PRESSURE: 91 MMHG

## 2020-01-21 DIAGNOSIS — L72.3 SEBACEOUS CYST: Primary | ICD-10-CM

## 2020-01-21 NOTE — PATIENT INSTRUCTIONS
Daily wound care: 
 
Remove bandage daily You may shower and get wound wet 1/22/20 Pat dry and cover with bandage Follow up in 1 week for suture removal 
 
 
 
  
Epidermoid Cyst: Care Instructions Your Care Instructions An epidermoid (say \"Cheryl\") cyst is a lump just under the skin. These cysts can form when a hair follicle becomes blocked. They are common in acne and may occur on the face, neck, back, and genitals. However, they can form anywhere on the body. These cysts are not cancer and do not lead to cancer. They tend not to hurt, but they can sometimes become swollen and painful. They also may break open (rupture) and cause scarring. These cysts sometimes do not cause problems and may not need treatment. If you have a cyst that is swollen and hurts, your doctor may inject it with a medicine to help it heal. But it is more likely that a painful cyst will need to be removed. Your doctor will give you a shot of numbing medicine and cut into the cyst to drain it or remove it. This makes the symptoms go away. Follow-up care is a key part of your treatment and safety. Be sure to make and go to all appointments, and call your doctor if you are having problems. It's also a good idea to know your test results and keep a list of the medicines you take. How can you care for yourself at home? · Do not squeeze the cyst or poke it with a needle to open it. This can cause swelling, redness, and infection. · Always have a doctor look at any new lumps you get to make sure that they are not serious. When should you call for help? Watch closely for changes in your health, and be sure to contact your doctor if: 
  · You have a fever, redness, or swelling after you get a shot of medicine in the cyst.  
  · You see or feel a new lump on your skin. Where can you learn more? Go to http://danica-deejay.info/.  
Enter G057 in the search box to learn more about \"Epidermoid Cyst: Care Instructions. \" Current as of: April 1, 2019 Content Version: 12.2 © 1799-8593 [employer]. Care instructions adapted under license by [employer] (which disclaims liability or warranty for this information). If you have questions about a medical condition or this instruction, always ask your healthcare professional. [address] any warranty or liability for your use of this information. Care of Closed Wounds: After Your Visit Your Care Instructions Stitches, staples, or tape called Steri-Strips are sometimes used to keep the edges of a cut together and help it heal right. Skin adhesives such as Dermabond are also used to close cuts. When the adhesive dries, it forms a film that holds the edges of the cut together. Skin adhesives are sometimes called liquid stitches. You may have some swelling, color changes, and bloody crusting on or around the wound for 2 or 3 days. This is normal. Taking good care of your wound at home will help it heal quickly and reduce your chance of infection. Any wound that passes through the full thickness of skin will cause a permanent scar. The scar gradually improves for about a year. Protect your healing wound from too much sunlight. Follow-up care is a key part of your treatment and safety. Be sure to make and go to all appointments, and call your doctor if you are having problems. Its also a good idea to know your test results and keep a list of the medicines you take. How can you care for yourself at home? · If possible, prop up the injured area on a pillow when you ice it or anytime you sit or lie down during the next 3 days. Try to keep it above the level of your heart. This will help reduce swelling. · Put ice or a cold pack on your wound for 10 to 20 minutes at a time. Try to do this every 1 to 2 hours for the next 3 days (when you are awake) or until the swelling goes down.  Put a thin cloth between the ice pack and your skin. · Take an over-the-counter pain medicine, such as acetaminophen (Tylenol), ibuprofen (Advil, Motrin), or naproxen (Aleve). Read and follow all instructions on the label. Some pain is normal with a wound, but do not ignore pain that is getting worse. · Do not take two or more pain medicines at the same time unless the doctor told you to. Many pain medicines have acetaminophen, which is Tylenol. Too much acetaminophen (Tylenol) can be harmful. If you have stitches, staples, or Steri-Strips: 
· Leave the bandage on and do not get the wound wet for the first 24 to 48 hours. Use a plastic bag to cover the area when you shower. · After the first 24 to 48 hours, you can remove the bandage and gently wash the wound. If the bandage sticks to the wound, use warm water to loosen it. Do not scrub or soak the area. Do not go swimming. · Replace the bandage with a clean one at least once a day and whenever the old one gets wet or dirty. If a small wound is not likely to get dirty, is not draining, and is not in an area where clothing will rub it, you may not need a bandage. · Clean the wound with soap and water 2 times a day unless your doctor gives you different instructions. Don't use hydrogen peroxide or alcohol, which can slow healing. ¨ You may cover the wound with a thin layer of antibiotic ointment, such as bacitracin, and a nonstick bandage. ¨ Apply more ointment and replace the bandage as needed. · Do not remove the stitches on your own. Your doctor will tell you when to return to have the stitches removed. · Leave Steri-Strips on until they fall off. If a liquid skin adhesive was used to close the wound: 
· Leave the skin adhesive on your skin until it falls off on its own. This may take 5 to 10 days. · Do not scratch, rub, or pick at the adhesive. · Do not put tape directly over the adhesive.  
· You can shower with a skin adhesive in place, but do not soak the area in water. Do not go swimming. Be sure to gently dry the area after it gets wet. · Do not put any kind of ointment, cream, or lotion over the area. This can make the adhesive fall off too soon. · If the doctor bandaged the wound, keep the bandage clean and dry. Change the bandage each day until the adhesive film has fallen off or whenever the bandage gets wet or dirty. When should you call for help? Call your doctor now or seek immediate medical care if: · The skin near the wound is cool or pale or changes color. · You have tingling, weakness, or numbness in your limb near the wound. · The wound starts to bleed, and blood soaks through the bandage. Oozing small amounts of blood is normal. 
· You have trouble moving a limb near the wound. · You have signs of infection, such as: 
¨ Increased pain, swelling, warmth, or redness around the wound. ¨ Red streaks leading from the wound. ¨ Pus draining from the wound. ¨ A fever. Watch closely for changes in your health, and be sure to contact your doctor if: · The wound is not getting better each day. Where can you learn more? Go to Etherpad.be Enter A341 in the search box to learn more about \"Care of Closed Wounds: After Your Visit. \"  
© 9632-8594 [employer]wise[employer]. Care instructions adapted under license by [facility] (which disclaims liability or warranty for this information). This care instruction is for use with your licensed healthcare professional. If you have questions about a medical condition or this instruction, always ask your healthcare professional. [place] [address] any warranty or liability for your use of this information. Content Version: 53.7.422375; Last Revised: May 17, 2013

## 2020-01-21 NOTE — PROGRESS NOTES
1. Have you been to the ER, urgent care clinic since your last visit? Hospitalized since your last visit? Yes patient first I&D of abd abscess    2. Have you seen or consulted any other health care providers outside of the [address] [place] [address] since your last visit? Include any pap smears or colon screening.  No

## 2020-01-21 NOTE — PROGRESS NOTES
HISTORY OF PRESENT ILLNESS  Lainey Anaya is a 62 y.o. female who is referred by Dr. Santo Brown for further evaluation of an infected sebaceous cyst on the anterior abdominal wall. Ms. Halie Collins tells me that she had an abdominal wall abscess drained many years ago. Doing well until recently when she developed a recurrent mass which became progressively larger and more painful. Found to have an infected sebaceous cyst which was drained. Started on abx. Doing well since then. No further drainage from wound. No fevers or chills. She has otherwise been in her usual state of health. Past Medical History:  1998: ASCUS on Pap smear  childhood: Chickenpox  1968: Clavicle fracture  9/20/2017: Closed displaced fracture of proximal phalanx of right   middle finger      Comment:  Dr. Russel Almodovar.  Had fracture in 7/17; to have                osteotomy with open reduction internal fixation and                extensor tenolysis  02/2010: Closed fracture of tibial plateau      Comment:  Right.  ([place])  2011: Depression with anxiety      Comment:  Dr. Herlinda Akers  No date: Fibrocystic breast      Comment:  bilateral  04/2007: High cholesterol  1/21/2020: Sebaceous cyst    Past Surgical History:  1985: HX DILATION AND CURETTAGE      Comment:  due to afterbirth-vag. bleeding   06/07/2017: HX ORTHOPAEDIC; Right      Comment:  Dr. Tabitha Steele; 3rd finger surgery [place] splint placement                with percutaneous pin fixation of intercondylar proximal                phalanx fracture)  1962: HX TONSIL AND ADENOIDECTOMY  No date: HX WISDOM TEETH EXTRACTION  02/2010: TIBIAL SCOPE/SURG/FX AID,BICONDYLAR    Review of patient's family history indicates:  Problem: Anxiety      Relation: Mother          Age of Onset: (Not Specified)  Problem: Arthritis-osteo      Relation: Mother          Age of Onset: (Not Specified)  Problem: Cancer      Relation: Mother          Age of Onset: (Not Specified)  Problem: Heart Disease      Relation: Maternal Grandfather          Age of Onset: (Not Specified)          Comment: CHF  Problem: Stroke      Relation: Maternal Grandfather          Age of Onset: (Not Specified)  Problem: Hypertension      Relation: Maternal Grandfather          Age of Onset: (Not Specified)  Problem: Cancer      Relation: Father          Age of Onset: (Not Specified)          Comment: prostate  Problem: Dementia      Relation: Maternal Grandmother          Age of Onset: (Not Specified)  Problem: Depression      Relation: Paternal Aunt          Age of Onset: (Not Specified)  Problem: Depression      Relation: Paternal Uncle          Age of Onset: (Not Specified)    Social History: Employment - [employer]. Tobacco - Denies. EtOH - Denies. Review of systems negative except as noted. Review of Systems   Constitutional: Negative for chills and fever. Physical Exam  Vitals signs reviewed. Constitutional:       General: She is not in acute distress. Appearance: Normal appearance. HENT:      Head: Normocephalic and atraumatic. Eyes:      General: No scleral icterus. Neck:      Musculoskeletal: Neck supple. Cardiovascular:      Rate and Rhythm: Normal rate and regular rhythm. Pulmonary:      Effort: Pulmonary effort is normal.      Breath sounds: Normal breath sounds. Abdominal:      General: There is no distension. Palpations: Abdomen is soft. Tenderness: There is no tenderness. Musculoskeletal: Normal range of motion. Lymphadenopathy:      Cervical: No cervical adenopathy. Skin:            Comments: Approx. 1.5cm x 1.5cm, well circumscribed, freely movable subcutaneous mass. Overlying skin is erythematous. Minimal purulent appearing drainage. Clinically, this is c/w an infected sebaceous cyst.   Neurological:      General: No focal deficit present. Mental Status: She is alert.          ASSESSMENT and PLAN  Sumaya Dinh is a 62 y.o. female with an infected sebaceous cyst on her abdominal wall. In view of the findings on history and physical exam she should benefit from excision. Discussed procedure with her including risks of bleeding, infection, recurrence. She understands and wishes to proceed. Consent on chart. [facility]  OFFICE PROCEDURE PROGRESS NOTE        Chart reviewed for the following:   Molina Rodriguez MD, have reviewed the History, Physical and updated the Allergic reactions for [address] [place] [address] performed immediately prior to start of procedure:   I, Sindi Piper MD, have performed the following reviews on [facility] prior to the start of the procedure:            * Patient was identified by name and date of birth   * Agreement on procedure being performed was verified  * Risks and Benefits explained to the patient  * Procedure site verified and marked as necessary  * Patient was positioned for comfort  * Consent was signed and verified     Time: 2:30 PM      Date of procedure: 1/21/2020    Procedure performed by:  Sindi Piper MD    Provider assisted by: Brenda Amado LPN    How tolerated by patient: tolerated the procedure well with no complications    Post Procedural Pain Scale: 0 - No Hurt    Comments: None. Procedure: Following betadine prep and drape, local anesthetic was infiltrated and an incision over the infected sebaceous cyst was opened sharply. Purulent material was evacuated. The remainder of the sebaceous cyst was dissected free circumferentially and excised. Hemostasis with pressure. Incision closed with interrupted 3-0 Nylon vertical mattress sutures. Abx ointment and dry dressing applied. Told Ms. Hackett that she can remove dressing tomorrow and get incision wet. Asked her to keep a dry dressing over incision. Tylenol or Motrin for pain. Will see in one more week or earlier if need be.     CC: Arslan Anthony MD

## 2020-01-28 ENCOUNTER — OFFICE VISIT (OUTPATIENT)
Dept: SURGERY | Age: 60
End: 2020-01-28

## 2020-01-28 VITALS
HEIGHT: 65 IN | OXYGEN SATURATION: 98 % | HEART RATE: 82 BPM | TEMPERATURE: 98.3 F | RESPIRATION RATE: 18 BRPM | SYSTOLIC BLOOD PRESSURE: 125 MMHG | DIASTOLIC BLOOD PRESSURE: 86 MMHG | WEIGHT: 165.6 LBS | BODY MASS INDEX: 27.59 KG/M2

## 2020-01-28 DIAGNOSIS — L72.3 SEBACEOUS CYST: Primary | ICD-10-CM

## 2020-01-28 NOTE — PROGRESS NOTES
1. Have you been to the ER, urgent care clinic since your last visit? Hospitalized since your last visit? No    2. Have you seen or consulted any other health care providers outside of the [address] [place] [address] since your last visit? Include any pap smears or colon screening.  no

## 2020-01-28 NOTE — PATIENT INSTRUCTIONS
Epidermoid Cyst: Care Instructions  Your Care Instructions  An epidermoid (say \"ge-tni-EKC-dagoberto\") cyst is a lump just under the skin. These cysts can form when a hair follicle becomes blocked. They are common in acne and may occur on the face, neck, back, and genitals. However, they can form anywhere on the body. These cysts are not cancer and do not lead to cancer. They tend not to hurt, but they can sometimes become swollen and painful. They also may break open (rupture) and cause scarring. These cysts sometimes do not cause problems and may not need treatment. If you have a cyst that is swollen and hurts, your doctor may inject it with a medicine to help it heal. But it is more likely that a painful cyst will need to be removed. Your doctor will give you a shot of numbing medicine and cut into the cyst to drain it or remove it. This makes the symptoms go away. Follow-up care is a key part of your treatment and safety. Be sure to make and go to all appointments, and call your doctor if you are having problems. It's also a good idea to know your test results and keep a list of the medicines you take. How can you care for yourself at home? · Do not squeeze the cyst or poke it with a needle to open it. This can cause swelling, redness, and infection. · Always have a doctor look at any new lumps you get to make sure that they are not serious. When should you call for help? Watch closely for changes in your health, and be sure to contact your doctor if:    · You have a fever, redness, or swelling after you get a shot of medicine in the cyst.     · You see or feel a new lump on your skin. Where can you learn more? Go to http://danica-deejay.info/. Enter L858 in the search box to learn more about \"Epidermoid Cyst: Care Instructions. \"  Current as of: April 1, 2019  Content Version: 12.2  © 4268-3610 [employer].  Care instructions adapted under license by [employer] (which disclaims liability or warranty for this information). If you have questions about a medical condition or this instruction, always ask your healthcare professional. [address] any warranty or liability for your use of this information.

## 2020-01-28 NOTE — PROGRESS NOTES
Subjective:      Dayo Cavazos is a 62 y.o. female who presents today for wound check. She is 1 week status post excision of abdominal sebaceous cyst.     Patient has an advanced directive:  yes    Ms. Yared Gunderson has a reminder for a \"due or due soon\" health maintenance. I have asked that she contact her primary care provider for follow-up on this health maintenance. Objective:     Visit Vitals  /86   Pulse 82   Temp 98.3 °F (36.8 °C) (Oral)   Resp 18   Ht 5' 5\" (1.651 m)   Wt 165 lb 9.6 oz (75.1 kg)   LMP 02/15/2011 (LMP Unknown)   SpO2 98%   BMI 27.56 kg/m²       Wound:   wound healing well, draining serous anginous, red raised rash around incision. Sutures removed. Bandge applied. Assessment:     Wound check. Plan: 1. Follow up as needed  2. May apply hydrocortisone and jock itch cream around incision. 3. Keep incision open to air in evenings. Pt verbalized understanding and questions were answered to the best of my knowledge and ability.

## 2020-02-04 LAB — HEMOCCULT STL QL IA: POSITIVE

## 2020-02-06 DIAGNOSIS — Z12.11 SCREENING FOR COLON CANCER: Primary | ICD-10-CM

## 2020-02-07 NOTE — PROGRESS NOTES
Your FIT, or take home colon cancer screening test returned positive for microscopic blood. This means you should have a full colonoscopy for further evaluation. Dr. Ivette Ryan has placed a referral order for you to see a gastroenterologist about this.

## 2020-02-07 NOTE — PROGRESS NOTES
Spoke with patient and after verifying name and date of birth of patient gave patient test results and any instructions in note per provider and contact information for Dr Beryle Gee. Patient stated understanding.

## 2022-03-19 PROBLEM — F10.21 ALCOHOL DEPENDENCE IN REMISSION (HCC): Status: ACTIVE | Noted: 2019-10-23

## 2022-03-19 PROBLEM — E55.9 VITAMIN D DEFICIENCY: Status: ACTIVE | Noted: 2017-06-09

## 2022-03-19 PROBLEM — L72.3 SEBACEOUS CYST: Status: ACTIVE | Noted: 2020-01-21

## 2024-10-04 ENCOUNTER — HOSPITAL ENCOUNTER (EMERGENCY)
Facility: HOSPITAL | Age: 64
Discharge: HOME | End: 2024-10-04
Attending: STUDENT IN AN ORGANIZED HEALTH CARE EDUCATION/TRAINING PROGRAM
Payer: MEDICARE

## 2024-10-04 VITALS
SYSTOLIC BLOOD PRESSURE: 160 MMHG | OXYGEN SATURATION: 96 % | DIASTOLIC BLOOD PRESSURE: 101 MMHG | TEMPERATURE: 98.1 F | HEIGHT: 65 IN | HEART RATE: 80 BPM | WEIGHT: 150.13 LBS | BODY MASS INDEX: 25.01 KG/M2 | RESPIRATION RATE: 18 BRPM

## 2024-10-04 DIAGNOSIS — M62.838 NECK MUSCLE SPASM: ICD-10-CM

## 2024-10-04 DIAGNOSIS — M54.2 NECK PAIN: Primary | ICD-10-CM

## 2024-10-04 DIAGNOSIS — M50.30 DDD (DEGENERATIVE DISC DISEASE), CERVICAL: ICD-10-CM

## 2024-10-04 PROCEDURE — 6370000000 HC RX 637 (ALT 250 FOR IP)

## 2024-10-04 PROCEDURE — 6360000002 HC RX W HCPCS

## 2024-10-04 PROCEDURE — 99284 EMERGENCY DEPT VISIT MOD MDM: CPT

## 2024-10-04 PROCEDURE — 96372 THER/PROPH/DIAG INJ SC/IM: CPT

## 2024-10-04 RX ORDER — CYCLOBENZAPRINE HCL 10 MG
10 TABLET ORAL ONCE
Status: COMPLETED | OUTPATIENT
Start: 2024-10-04 | End: 2024-10-04

## 2024-10-04 RX ORDER — CYCLOBENZAPRINE HCL 5 MG
7.5 TABLET ORAL 2 TIMES DAILY PRN
Qty: 20 TABLET | Refills: 0 | Status: SHIPPED | OUTPATIENT
Start: 2024-10-04 | End: 2024-10-14

## 2024-10-04 RX ORDER — HYDROCODONE BITARTRATE AND ACETAMINOPHEN 5; 325 MG/1; MG/1
1 TABLET ORAL EVERY 8 HOURS PRN
Qty: 6 TABLET | Refills: 0 | Status: SHIPPED | OUTPATIENT
Start: 2024-10-04 | End: 2024-10-06

## 2024-10-04 RX ORDER — KETOROLAC TROMETHAMINE 30 MG/ML
15 INJECTION, SOLUTION INTRAMUSCULAR; INTRAVENOUS ONCE
Status: COMPLETED | OUTPATIENT
Start: 2024-10-04 | End: 2024-10-04

## 2024-10-04 RX ORDER — HYDROCODONE BITARTRATE AND ACETAMINOPHEN 5; 325 MG/1; MG/1
1 TABLET ORAL
Status: COMPLETED | OUTPATIENT
Start: 2024-10-04 | End: 2024-10-04

## 2024-10-04 RX ADMIN — CYCLOBENZAPRINE 10 MG: 10 TABLET, FILM COATED ORAL at 20:47

## 2024-10-04 RX ADMIN — KETOROLAC TROMETHAMINE 15 MG: 30 INJECTION, SOLUTION INTRAMUSCULAR at 20:46

## 2024-10-04 RX ADMIN — HYDROCODONE BITARTRATE AND ACETAMINOPHEN 1 TABLET: 5; 325 TABLET ORAL at 21:28

## 2024-10-04 ASSESSMENT — PAIN SCALES - GENERAL
PAINLEVEL_OUTOF10: 10

## 2024-10-04 ASSESSMENT — PAIN DESCRIPTION - DESCRIPTORS
DESCRIPTORS: ACHING
DESCRIPTORS: ACHING;SPASM
DESCRIPTORS: ACHING

## 2024-10-04 ASSESSMENT — PAIN DESCRIPTION - FREQUENCY: FREQUENCY: CONTINUOUS

## 2024-10-04 ASSESSMENT — PAIN - FUNCTIONAL ASSESSMENT
PAIN_FUNCTIONAL_ASSESSMENT: PREVENTS OR INTERFERES SOME ACTIVE ACTIVITIES AND ADLS
PAIN_FUNCTIONAL_ASSESSMENT: PREVENTS OR INTERFERES SOME ACTIVE ACTIVITIES AND ADLS
PAIN_FUNCTIONAL_ASSESSMENT: 0-10
PAIN_FUNCTIONAL_ASSESSMENT: ACTIVITIES ARE NOT PREVENTED

## 2024-10-04 ASSESSMENT — PAIN DESCRIPTION - PAIN TYPE: TYPE: CHRONIC PAIN

## 2024-10-04 ASSESSMENT — PAIN DESCRIPTION - LOCATION
LOCATION: NECK

## 2024-10-04 ASSESSMENT — PAIN DESCRIPTION - ORIENTATION
ORIENTATION: RIGHT
ORIENTATION: RIGHT;LEFT
ORIENTATION: POSTERIOR

## 2024-10-04 ASSESSMENT — PAIN DESCRIPTION - ONSET: ONSET: ON-GOING

## 2024-10-04 NOTE — ED TRIAGE NOTES
Patient arrives to ED reports neck pain since August.  Patient seen at [facility] and given pain medicine. Denies known injury, reports going to PT

## 2024-10-05 NOTE — ED PROVIDER NOTES
movements intact.   Neck:      Comments: The trapezius region is tender bilaterally  No superficial skin changes, erythema, ecchymosis, wound  No midline tenderness or step-offs  Cardiovascular:      Rate and Rhythm: Normal rate.   Pulmonary:      Effort: Pulmonary effort is normal.   Musculoskeletal:         General: Normal range of motion.      Cervical back: Normal range of motion and neck supple. Tenderness present. No rigidity.   Lymphadenopathy:      Cervical: No cervical adenopathy.   Skin:     General: Skin is warm and dry.   Neurological:      General: No focal deficit present.      Mental Status: She is alert.   Psychiatric:         Mood and Affect: Mood normal.         Behavior: Behavior normal.         DIAGNOSTIC RESULTS     EKG: All EKG's are interpreted by the Emergency Department Physician who either signs or Co-signs this chart in the absence of a cardiologist.        RADIOLOGY:   Non-plain film images such as CT, Ultrasound and MRI are read by the radiologist. Plain radiographic images are visualized and preliminarily interpreted by the emergency physician with the below findings:        Interpretation per the Radiologist below, if available at the time of this note:    No orders to display        LABS:  Labs Reviewed - No data to display    All other labs were within normal range or not returned as of this dictation.    EMERGENCY DEPARTMENT COURSE and DIFFERENTIAL DIAGNOSIS/MDM:   Vitals:    Vitals:    10/04/24 1907   BP: (!) 160/101   Pulse: 80   Resp: 18   Temp: 98.1 °F (36.7 °C)   TempSrc: Oral   SpO2: 96%   Weight: 68.1 kg (150 lb 2.1 oz)   Height: 1.651 m (5' 5\")           Medical Decision Making  64-year-old female coming in with chronic neck pain.  No trauma or inciting event.  Has followed with an orthopedic provider and pain management.  Is currently using ice, heat, Advil, Tylenol, gabapentin , physical therapy exercises, mindfulness techniques at home.  Given history and physical exam  dictations but occasionally words are mis-transcribed.)    Jose Flanagan PA-C (electronically signed)  Emergency Attending Physician / Physician Assistant / Nurse Practitioner    Presentation, management, and disposition were discussed with the attending physician, Dr. Muir who is in agreement with plan of care.         Jose Flanagan PA-C  10/04/24 [id number]

## 2024-10-05 NOTE — DISCHARGE INSTRUCTIONS
Today you were seen for ongoing neck pain.  We were able to give you a shot of pain medication while you are here in addition to a muscle relaxer that is different than the one that you are prescribed recently.     I would recommend calling one of the above pain medicine doctors to establish care and to further be evaluated and treatment.  These providers may have interventions that we are not able to provide here in the emergency department.  I would suggest using ibuprofen for inflammation and pain.  I would also recommend continuing to do things you are doing at home including physical therapy, heat/ice, mindfulness.  CBT therapy is something we also recommend; you may do an Internet search to find a nearby practitioner that practices CBT therapy.  Other methods such as acupuncture may also help with your pain.  Please return if you have a worsening of your symptoms or develop loss of control of your bowel/bladder, numbness in the groin, fever/chills, weakness in the arms or legs, radicular pain.

## 2024-11-20 ENCOUNTER — HOSPITAL ENCOUNTER (OUTPATIENT)
Facility: HOSPITAL | Age: 64
Discharge: HOME | End: 2024-11-23
Payer: MEDICARE

## 2024-11-20 DIAGNOSIS — M54.12 RADICULOPATHY, CERVICAL: ICD-10-CM

## 2024-11-20 PROCEDURE — 72141 MRI NECK SPINE W/O DYE: CPT

## 2024-12-25 ENCOUNTER — HOSPITAL ENCOUNTER (EMERGENCY)
Facility: HOSPITAL | Age: 64
Discharge: HOME | End: 2024-12-25
Attending: EMERGENCY MEDICINE
Payer: MEDICARE

## 2024-12-25 VITALS
TEMPERATURE: 99 F | BODY MASS INDEX: 26.48 KG/M2 | HEIGHT: 65 IN | OXYGEN SATURATION: 98 % | RESPIRATION RATE: 18 BRPM | HEART RATE: 73 BPM | WEIGHT: 158.95 LBS | SYSTOLIC BLOOD PRESSURE: 163 MMHG | DIASTOLIC BLOOD PRESSURE: 96 MMHG

## 2024-12-25 DIAGNOSIS — M54.2 NECK PAIN: Primary | ICD-10-CM

## 2024-12-25 DIAGNOSIS — M62.838 TRAPEZIUS MUSCLE SPASM: ICD-10-CM

## 2024-12-25 DIAGNOSIS — S16.1XXA ACUTE STRAIN OF NECK MUSCLE, INITIAL ENCOUNTER: ICD-10-CM

## 2024-12-25 LAB
COMMENT:: NORMAL
SPECIMEN HOLD: NORMAL

## 2024-12-25 PROCEDURE — 6360000002 HC RX W HCPCS

## 2024-12-25 PROCEDURE — 36415 COLL VENOUS BLD VENIPUNCTURE: CPT

## 2024-12-25 PROCEDURE — 93005 ELECTROCARDIOGRAM TRACING: CPT | Performed by: EMERGENCY MEDICINE

## 2024-12-25 PROCEDURE — 6370000000 HC RX 637 (ALT 250 FOR IP)

## 2024-12-25 PROCEDURE — 99284 EMERGENCY DEPT VISIT MOD MDM: CPT

## 2024-12-25 RX ORDER — KETOROLAC TROMETHAMINE 30 MG/ML
15 INJECTION, SOLUTION INTRAMUSCULAR; INTRAVENOUS ONCE
Status: COMPLETED | OUTPATIENT
Start: 2024-12-25 | End: 2024-12-25

## 2024-12-25 RX ORDER — MORPHINE SULFATE 2 MG/ML
2 INJECTION, SOLUTION INTRAMUSCULAR; INTRAVENOUS ONCE
Status: COMPLETED | OUTPATIENT
Start: 2024-12-25 | End: 2024-12-25

## 2024-12-25 RX ORDER — HYDROCODONE BITARTRATE AND ACETAMINOPHEN 5; 325 MG/1; MG/1
1 TABLET ORAL
Status: COMPLETED | OUTPATIENT
Start: 2024-12-25 | End: 2024-12-25

## 2024-12-25 RX ORDER — METHOCARBAMOL 500 MG/1
750 TABLET, FILM COATED ORAL ONCE
Status: COMPLETED | OUTPATIENT
Start: 2024-12-25 | End: 2024-12-25

## 2024-12-25 RX ORDER — METHYLPREDNISOLONE 4 MG/1
TABLET ORAL
Qty: 21 TABLET | Refills: 0 | Status: SHIPPED | OUTPATIENT
Start: 2024-12-25

## 2024-12-25 RX ORDER — HYDROCODONE BITARTRATE AND ACETAMINOPHEN 5; 325 MG/1; MG/1
1 TABLET ORAL EVERY 6 HOURS PRN
Qty: 6 TABLET | Refills: 0 | Status: SHIPPED | OUTPATIENT
Start: 2024-12-25 | End: 2024-12-28

## 2024-12-25 RX ORDER — NAPROXEN 500 MG/1
500 TABLET ORAL 2 TIMES DAILY
Qty: 60 TABLET | Refills: 0 | Status: SHIPPED | OUTPATIENT
Start: 2024-12-25

## 2024-12-25 RX ORDER — METHOCARBAMOL 750 MG/1
750 TABLET, FILM COATED ORAL EVERY 6 HOURS PRN
Qty: 16 TABLET | Refills: 0 | Status: SHIPPED | OUTPATIENT
Start: 2024-12-25 | End: 2024-12-29

## 2024-12-25 RX ADMIN — HYDROCODONE BITARTRATE AND ACETAMINOPHEN 1 TABLET: 5; 325 TABLET ORAL at 16:45

## 2024-12-25 RX ADMIN — KETOROLAC TROMETHAMINE 15 MG: 30 INJECTION, SOLUTION INTRAMUSCULAR at 17:24

## 2024-12-25 RX ADMIN — MORPHINE SULFATE 2 MG: 2 INJECTION, SOLUTION INTRAMUSCULAR; INTRAVENOUS at 17:24

## 2024-12-25 RX ADMIN — METHOCARBAMOL 750 MG: 500 TABLET ORAL at 16:45

## 2024-12-25 ASSESSMENT — PAIN DESCRIPTION - ORIENTATION: ORIENTATION: LEFT

## 2024-12-25 ASSESSMENT — PAIN - FUNCTIONAL ASSESSMENT
PAIN_FUNCTIONAL_ASSESSMENT: 0-10
PAIN_FUNCTIONAL_ASSESSMENT: PREVENTS OR INTERFERES SOME ACTIVE ACTIVITIES AND ADLS

## 2024-12-25 ASSESSMENT — PAIN DESCRIPTION - LOCATION: LOCATION: NECK

## 2024-12-25 ASSESSMENT — PAIN SCALES - GENERAL: PAINLEVEL_OUTOF10: 10

## 2024-12-25 ASSESSMENT — PAIN DESCRIPTION - DESCRIPTORS: DESCRIPTORS: ACHING

## 2024-12-25 NOTE — DISCHARGE INSTRUCTIONS
You were seen in the emergency department for leanne pain. We did not finding anything that would require emergent intervention or hospital admission. However, you should review your visit and all results with your primary care doctor.     Discharge follow up plans:     Medications:   Medrol dose pack: this is a steroid taper. Steroids are strong anti inflammatories. However, they will elevate your blood sugar and can sometimes cause insomnia so I would recommend taking it in the mornings. Pay attention to your diet and check your blood sugars at home if you have a glucometer or any history of diabetes / pre diabetes  Robaxin: this is a muscle relaxer. Take this as needed. This makes people tired. Do not drive or operate machinery while taking this medication. Do not drink alcohol while taking this medication  Naproxen: this is another anti inflammatory, in the NSAID class. Take this twice daily with food in your stomach to avoid any irritation to the stomach lining. Do not take advil, aleve, diclofenac, celebrex, ibuprofen or any other NSAID medications while taking naproxen.   Tylenol: take up to 1000 mg three times daily as needed for additional pain control     Follow up: You should follow up with your primary care doctor in the next 2-3 days. You should also follow up with orthopedic, please request to see a spine specialist. Spine surgeons don't always do surgery! They can offer more non surgical management options such as formal physical therapy or targeted steroid injections if indicated. Their contact information is provided in your discharge paperwork. You will need to call to schedule an appointment. Please call as soon as possible.     Please return to the emergency department if you have any numbness or weakness in your legs, any difficulty urinating, any falls or new injury, or if you have any new, worsening or concerning symptoms.    Thank you for involving us in your care!

## 2024-12-25 NOTE — ED PROVIDER NOTES
[facility] EMERGENCY DEP  EMERGENCY DEPARTMENT ENCOUNTER      Pt Name: Cindi Lynn  MRN: [id number]  Birthdate 1960  Date of evaluation: 12/25/2024  Provider: Leticia Burt PA-C    CHIEF COMPLAINT       Chief Complaint   Patient presents with    Neck Pain         HISTORY OF PRESENT ILLNESS   (Location/Symptom, Timing/Onset, Context/Setting, Quality, Duration, Modifying Factors, Severity)  Note limiting factors.   Cindi Lynn is a 64 y.o. female with history of  has a past medical history of ASCUS on Pap smear (1998), Chickenpox (childhood), Clavicle fracture (1968), Closed displaced fracture of proximal phalanx of right middle finger (9/20/2017), Closed fracture of tibial plateau (02/2010), Depression with anxiety (2011), Fibrocystic breast, High cholesterol (04/2007), and Sebaceous cyst (1/21/2020). who presents from home to [facility] ED with cc of left-sided neck pain.  Patient reports pain worsened overnight into today.  She has a history of similar pains previously.  She has had previous CT and MRI of the neck due to this pain.  Denies injury or trauma.  Pain does not radiate into the arm or chest.  Reports her trapezius feels tight.  She sees pain management and orthopedics.  She reports she was specific at another cortisone injection but they did not give it.  She has been using a nerve stimulator for pain.          PCP: Roseline Olivas MD    There are no other complaints, changes or physical findings at this time.        The history is provided by the patient.         Review of External Medical Records:     Nursing Notes were reviewed.    REVIEW OF SYSTEMS    (2-9 systems for level 4, 10 or more for level 5)     Review of Systems   Musculoskeletal:  Positive for neck pain.       Except as noted above the remainder of the review of systems was reviewed and negative.       PAST MEDICAL HISTORY     Past Medical History:   Diagnosis Date    ASCUS on Pap smear 1998    Chickenpox childhood

## 2024-12-25 NOTE — ED TRIAGE NOTES
Patient arrives to ED reports intermittent left sided chronic neck pain.  Patient reports this episode started last night and worsened today.     Patient seen at PT and 2 different ortho clinics, patient also reports having MRI and 2 CT scans

## 2024-12-27 LAB
EKG ATRIAL RATE: 74 BPM
EKG DIAGNOSIS: NORMAL
EKG P AXIS: 46 DEGREES
EKG P-R INTERVAL: 150 MS
EKG Q-T INTERVAL: 408 MS
EKG QRS DURATION: 82 MS
EKG QTC CALCULATION (BAZETT): 452 MS
EKG R AXIS: -4 DEGREES
EKG T AXIS: 40 DEGREES
EKG VENTRICULAR RATE: 74 BPM

## 2024-12-27 PROCEDURE — 93010 ELECTROCARDIOGRAM REPORT: CPT | Performed by: SPECIALIST
